# Patient Record
Sex: FEMALE | Race: WHITE | NOT HISPANIC OR LATINO | Employment: STUDENT | URBAN - METROPOLITAN AREA
[De-identification: names, ages, dates, MRNs, and addresses within clinical notes are randomized per-mention and may not be internally consistent; named-entity substitution may affect disease eponyms.]

---
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
## 2024-02-05 ASSESSMENT — PATIENT HEALTH QUESTIONNAIRE - PHQ9
10. IF YOU CHECKED OFF ANY PROBLEMS, HOW DIFFICULT HAVE THESE PROBLEMS MADE IT FOR YOU TO DO YOUR WORK, TAKE CARE OF THINGS AT HOME, OR GET ALONG WITH OTHER PEOPLE: SOMEWHAT DIFFICULT
SUM OF ALL RESPONSES TO PHQ QUESTIONS 1-9: 4
SUM OF ALL RESPONSES TO PHQ QUESTIONS 1-9: 4

## 2024-02-06 ENCOUNTER — OFFICE VISIT (OUTPATIENT)
Dept: PSYCHOLOGY | Facility: CLINIC | Age: 19
End: 2024-02-06
Payer: COMMERCIAL

## 2024-02-06 DIAGNOSIS — F33.41 RECURRENT MAJOR DEPRESSION IN PARTIAL REMISSION (H): Primary | ICD-10-CM

## 2024-02-06 DIAGNOSIS — F64.0 GENDER DYSPHORIA IN ADULT: ICD-10-CM

## 2024-02-06 PROCEDURE — 90837 PSYTX W PT 60 MINUTES: CPT | Performed by: PSYCHOLOGIST

## 2024-02-14 NOTE — PROGRESS NOTES
[facility] - Progress Note    Date of Service: 24   Name: Estela Reddy  : 2005  Medical Record Number: [id number]  Treating Provider: Alla Jones LP  Type of Session: Individual  Present in Session: Mike  Session Start and Stop Time: 2:00- 2:56pm  Number of Minutes: 56  Treatment Plan Completed: 23    SERVICE MODALITY:  In Person    DSM-5 Diagnoses:  302.85  F64.0) Gender Dysphoria   (F33.41) Recurrent Major Depression (in remission)    Current Reported Symptoms and Status update:  Reports that depression and anxiety symptoms have stabilized and can now be considered to have moved into remission. Continues to have gender dysphoria related to her genitals that she would like to address.  As she has in the past, she continues to want to move forward with body dysphoria goals but this is not top of mind right now.    Changes since last session: reporting that she is struggling with dynamics between parents and with her due to impending divorce; has felt uncomfortable meeting the people that parents are dating.     Progress Toward Treatment Goals:   Satisfactory progress     Therapeutic Interventions/Treatment Strategies:    Area(s) of treatment focus addressed in this session included Symptom Management, Gender Health, Develop / Improve Independent Living Skills and Maintenance of Progress    Supportive Techniques designed to provide a safe place for Mike to explore gender-related issues as well as the anxiety/depression symptoms she experiences related to stressors and relational difficulties. Processed a number of instances that have been very uncomfortable for Mike in terms of parental divorce. In light of what we worked on last session, processed the extent to which she has been using support system. Encouraged her to actually share what is happening for her rather than continue to present as though she is not bothered by what is happening.  Provided a safe place  to actually explore the feelings that if left unchecked, can lead to increase in depressive symptoms.  Processed how she wants to show up in her relationships with her parents during this time and how she can control her 50% of this.      Psychotherapist offered support, feedback and validation and reinforced skills.       Patient Response:   Patient responded to session by listening, accepting support, verbalizing understanding and actively engaged  Possible barriers to participation / learning include: N/A    Current Mental Status Exam:   Appearance:  Appropriate   Eye Contact:  Good   Attitude / Demeanor: Cooperative  Interested Playful Friendly Pleasant  Speech      Rate / Production: Normal/ Responsive      Volume:  Normal  volume  Orientation:  All  Mood:   Sad but also okay  Affect:   Appropriate to context  Thought Content: Clear   Insight:   Good       Plan/Need for Future Services:  Return for therapy in 4 weeks to treat diagnosed problems.    Patient has a current master individualized treatment plan.  See Epic treatment plan for more information.    Referral / Collaboration:  Referral to another professional/service is not indicated at this time..  Emergency Services Needed?  No    Assignment:  NEW: Think differently about seeking support.   CONTINUED:Consider gender-related goals.     Interactive Complexity:  There are four specific communication difficulties that complicate the work of the primary psychiatric procedure.  Interactive complexity (+09289) may be reported when at least one of these difficulties is present.    Communication difficulties present during current the psychiatric procedure include:  None.      Signature/Title:    Alla Jones LP

## 2024-04-02 ENCOUNTER — OFFICE VISIT (OUTPATIENT)
Dept: PSYCHOLOGY | Facility: CLINIC | Age: 19
End: 2024-04-02
Payer: COMMERCIAL

## 2024-04-02 DIAGNOSIS — F33.41 RECURRENT MAJOR DEPRESSION IN PARTIAL REMISSION (H): ICD-10-CM

## 2024-04-02 DIAGNOSIS — F64.0 GENDER DYSPHORIA IN ADULT: Primary | ICD-10-CM

## 2024-04-02 PROCEDURE — 90837 PSYTX W PT 60 MINUTES: CPT | Performed by: PSYCHOLOGIST
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
## 2024-04-03 ENCOUNTER — PATIENT OUTREACH (OUTPATIENT)
Dept: CARE COORDINATION | Facility: CLINIC | Age: 19
End: 2024-04-03
Payer: COMMERCIAL

## 2024-04-03 NOTE — PROGRESS NOTES
Clinic Care Coordination Contact  [facility]/Voicemail    Clinical Data: Care Coordinator Outreach    Outreach Documentation Number of Outreach Attempt   4/3/2024   2:50 PM 1       Left message on patient's voicemail with call back information and requested return call.    Plan: Care Coordinator will send care coordination introduction letter with care coordinator contact information and explanation of care coordination services via [employer]. Care Coordinator will try to reach patient again in 1-2 business days.    BETSEY Rodriguez  Social Work Care Coordinator  [facility]  [phone]  [email or web address]  Pronouns: They/He

## 2024-04-03 NOTE — ADDENDUM NOTE
Addended by: DORIS CALIXTO on: 4/3/2024 09:18 AM     Modules accepted: Orders     Well baby, under 8 days old

## 2024-04-03 NOTE — LETTER
[facility] CARE COORDINATION  [facility] Clinic  [address], [address], [place] [address]  April 5, 2024    Mike Reddy  [address] [facility] [address]      Dear Mike,    I am a clinic care coordinator who works with Dr. Alla Jones at the [facility] in [place], [place]. I wanted to introduce myself and provide you with my contact information for you to be able to call me with any questions or concerns. I have been trying to reach you recently to introduce Clinic Care Coordination. I recently tried to call and was unable to reach you. Below is a description of clinic care coordination and how I can further assist you.       The clinic care coordination team is made up of a registered nurse, , and  financial resource worker who understand the health care system. The goal of clinic care coordination is to help you manage your health and improve access to the health care system. Our team works alongside your provider to assist you in determining your health and social needs. We can help you obtain health care and community resources, providing you with necessary information and education. We can work with you through any barriers and develop a care plan that helps coordinate and strengthen the communication between you and your care team.  Our services are voluntary and are offered without charge to you personally.    Please feel free to contact me with any questions or concerns regarding care coordination and what we can offer.      We are focused on providing you with the highest-quality healthcare experience possible.    Sincerely,     BETSEY Rodriguez  Social Work Care Coordinator  [facility] Gender and Sexual Health [facility]  [phone]  [email or web address]  Pronouns: They/He

## 2024-04-05 NOTE — PROGRESS NOTES
Clinic Care Coordination Contact  [facility]/Voicemail    Clinical Data: Care Coordinator Outreach    Outreach Documentation Number of Outreach Attempt   4/3/2024   2:50 PM 1   4/5/2024  11:52 AM 2       Left message on patient's voicemail with call back information and requested return call.    Plan: Care Coordinator sent care coordination introduction letter on 4/5/24 via [employer]. Care Coordinator will try to reach patient again in 10 business days.    BETSEY Rodriguez  Social Work Care Coordinator  [facility] Sexual Health [facility]  [phone]  [email or web address]  Pronouns: They/He

## 2024-04-16 ENCOUNTER — PATIENT OUTREACH (OUTPATIENT)
Dept: CARE COORDINATION | Facility: CLINIC | Age: 19
End: 2024-04-16
Payer: COMMERCIAL

## 2024-04-16 ASSESSMENT — ACTIVITIES OF DAILY LIVING (ADL): DEPENDENT_IADLS:: INDEPENDENT

## 2024-04-16 NOTE — PROGRESS NOTES
Clinic Care Coordination Contact  Clinic Care Coordination Contact  OUTREACH    Referral Information:  Referral Source:  (Dr. Alla Jones, )         Chief Complaint   Patient presents with    Clinic Care Coordination - Initial        Universal Utilization: This worker spoke with Mike confirming she still needs to find a provider for hormone injections and Dexa scans. This worker and Mike discussed Dr. Kessler and the [facility]. Mike endorsed understanding and denied having any further questions at this time. Mike confirms she would like ongoing monthly check-ins      Mike reports understanding and denies any additional questions or concerns at this time. YISSEL CC engaged in AIDET communication during encounter.     Clinic Utilization  Difficulty keeping appointments:: No  Compliance Concerns: No  No-Show Concerns: No  No PCP office visit in Past Year: No  Utilization      No Show Count (past year)  0             ED Visits  0             Hospital Admissions  0                    Current as of: 4/10/2024  6:55 PM                Clinical Concerns:  Current Medical Concerns:  na    Current Behavioral Concerns: na    Education Provided to patient: care coordination, specialty providers   Pain  Pain (GOAL):: No  Health Maintenance Reviewed:    Clinical Pathway:     Medication Management:  Medication review status:   na     Functional Status:  Dependent ADLs:: Independent  Dependent IADLs:: Independent  Bed or wheelchair confined:: No  Mobility Status: Independent  Fallen 2 or more times in the past year?: No  Any fall with injury in the past year?: No    Living Situation:  Current living arrangement:: Not Assessed    Lifestyle & Psychosocial Needs:    Social Determinants of Health     Food Insecurity: Not on file   Depression: Not at risk (4/2/2024)    PHQ-2     PHQ-2 Score: 1   Housing Stability: Not on file   Tobacco Use: Medium Risk (4/19/2022)    Patient History     Smoking Tobacco Use: Passive Smoke Exposure - Never  Smoker     Smokeless Tobacco Use: Never     Passive Exposure: Yes   Financial Resource Strain: Not on file   Alcohol Use: Not on file   Transportation Needs: Not on file   Physical Activity: Not on file   Interpersonal Safety: Not on file   Stress: Not on file   Social Connections: Not on file   Health Literacy: Not on file     Inadequate nutrition (GOAL):: No  Tube Feeding: No  Inadequate activity/exercise (GOAL):: No  Significant changes in sleep pattern (GOAL): No  Transportation means:: Regular car     Roman Catholic or spiritual beliefs that impact treatment:: No  Mental health DX:: Yes  Mental health DX how managed:: Medication, Outpatient Counseling  Mental health management concern (GOAL):: No  Informal Support system:: Friends, Family          Resources and Interventions:  Current Resources:      Community Resources: OP Mental Health,      Equipment Currently Used at Home: none  Employment Status: employed full-time, student         Advance Care Plan/Directive  Advanced Care Plans/Directives on file:: No    Referrals Placed: Specialty Providers       Care Plan:  Care Plan: General       Problem: Patient is in need of specialty care       Long-Range Goal: Establish Specialty Care       Start Date: 4/16/2024    This Visit's Progress: 30%    Note:     Barriers: specialized medical care  Strengths: informed and active  Patient expressed understanding of goal: yes  Action steps to achieve this goal:  1. I will reach out to Dr. Kessler in [place] to start hormone suppressant injections  2. I will reach out to [facility] to complete needed scans  3. I will coordinate and collaborate with the [facility] to navigate problems should they arise.                                Patient/Caregiver understanding: yes    Outreach Frequency: monthly, more frequently as needed  Future Appointments                In 6 days [facility], INOCENTE [facility]UHNI    In 1 week Hong Rodriguez MD  [facility]    In 1 month Alla Jones LP [facility]            Plan:   Mike to follow up with Dr. Kessler for Hormone injections on site  Mike to contact [facility] for Dexa Scan  Mike to follow up with this worker as needed prior to next scheduled outreach  This worker to follow up in one month      BETSEY Rodriguez  Social Work Care Coordinator  [facility] Sexual Health Clinic  [phone]  [email or web address]  Pronouns: They/He

## 2024-04-16 NOTE — LETTER
[facility]: Care Plan    My Information     Name: Estela Reddy   YOB: 2005  Phone Number(s):  [phone] (home)   Address: [address] [place] [id number]    My Access Plan     Who to contact when I need help:  During business hours, call the clinic at: [phone]  After hours call the resident on call at:  [phone]  Preferred Hospital:    Preferred Pharmacy: [id number] - [place], [place] - [address] [address]    Other     My Care Team Members     Patient Care Team         Relationship Specialty Notifications Start End    Joan Sanford, NP PCP - General Family Medicine  7/31/23     Phone: [phone] Fax: [phone]         [address] [facility] [place] [address] [address]    Alla Jones LP Assigned Behavioral Health Provider   11/21/21     Phone: [phone] Fax: [phone]         [address] [facility] [address]    Joan Safnord NP Assigned PCP   1/25/24     Phone: [phone] Fax: [phone]         [address] [facility] [place] [address]    Moustapha Dong LSW Specialty Care Coordinator  Admissions 4/4/24               My Medications     Current Outpatient Medications   Medication Sig Dispense Refill    estradiol (ESTRACE) 2 MG tablet Take 2 tablets (4 mg) by mouth daily 180 tablet 1    sertraline (ZOLOFT) 100 MG tablet TAKE 2 TABLETS(200 MG) BY MOUTH DAILY. FOLLOW UP AS NEEDED FOR ANY HEALTHCARE QUESTIONS OR CONCERNS 180 tablet 0       My Life     Current Living Arrangement:  Current living arrangement:: Not Assessed   Type of residence:      Resources:       Community Resources: OP Mental Health,        Equipment Currently Used at Home: none     Advance Care Plan/Directive  Advanced Care Plans/Directives on file:: No     Referrals Placed: Specialty Providers    Activities that can be difficult for me at times:   Dependent ADLs:  Dependent ADLs:: Independent   Depended IADLs:  Dependent IADLs:: Independent   Mobility status: Mobility Status: Independent      My Health     Patient Active Problem List    Diagnosis Date Noted    Current mild episode of major depressive disorder without prior episode (H24) 04/19/2022     Priority: Medium    Vasovagal syncope 02/26/2019     Priority: Medium     Fainting, vomiting with blood draws. Needs to remain laying down for 15-20 minutes post blood draws.       Gender dysphoria in adolescent and adult 06/25/2017     Priority: Medium         My Goals     Care Plan: General       Problem: Patient is in need of specialty care       Long-Range Goal: Establish Specialty Care       Start Date: 4/16/2024    This Visit's Progress: 30%    Note:     Barriers: specialized medical care  Strengths: informed and active  Patient expressed understanding of goal: yes  Action steps to achieve this goal:  1. I will reach out to Dr. Kessler in [place] to start hormone suppressant injections  2. I will reach out to [facility] to complete needed scans  3. I will coordinate and collaborate with the [facility] to navigate problems should they arise.                                          F10.21

## 2024-04-16 NOTE — LETTER
[facility] CARE COORDINATION  Sexual and Gender Health Clinic  April 16, 2024    Estela Reddy  [address] [facility] [address]      Dear Mike,    I am a clinic care coordinator who works with Dr. Alla Jones LP and Dr. Hong Rodriguez MD at the [facility]. I wanted to introduce myself and provide you with my contact information for you to be able to call me with any questions or concerns. I wanted to thank you for spending the time to talk with me.  Below is a description of clinic care coordination and how I can further assist you.       The clinic care coordination team is made up of a registered nurse and  who understand the health care system. The goal of clinic care coordination is to help you manage your health and improve access to the health care system. Our team works alongside your provider to assist you in determining your health and social needs. We can help you obtain health care and community resources, providing you with necessary information and education. We can work with you through any barriers and develop a care plan that helps coordinate and strengthen the communication between you and your care team.  Our services are voluntary and are offered without charge to you personally.    Please feel free to contact me with any questions or concerns regarding care coordination and what we can offer.      We are focused on providing you with the highest-quality healthcare experience possible.    Sincerely,     BETSEY Rodriguez  Social Work Care Coordinator  [facility] Gender and Sexual Health [facility]  [phone]  [email or web address]  Pronouns: They/He

## 2024-04-18 ENCOUNTER — DOCUMENTATION ONLY (OUTPATIENT)
Dept: LAB | Facility: CLINIC | Age: 19
End: 2024-04-18
Payer: COMMERCIAL

## 2024-04-18 NOTE — PROGRESS NOTES
Patient has an upcoming lab appointment on 4.22.24 at 2:15 pm. Please review and place future orders that may be needed. Otherwise please call patient to cancel lab appointment.    Thank you,  BE lab staff

## 2024-04-19 DIAGNOSIS — F64.0 TRANSGENDER PERSON ON HORMONE THERAPY: Primary | ICD-10-CM

## 2024-04-19 DIAGNOSIS — Z79.899 TRANSGENDER PERSON ON HORMONE THERAPY: Primary | ICD-10-CM

## 2024-04-25 ENCOUNTER — OFFICE VISIT (OUTPATIENT)
Dept: FAMILY MEDICINE | Facility: CLINIC | Age: 19
End: 2024-04-25
Payer: COMMERCIAL

## 2024-04-25 VITALS
HEIGHT: 72 IN | SYSTOLIC BLOOD PRESSURE: 124 MMHG | DIASTOLIC BLOOD PRESSURE: 84 MMHG | BODY MASS INDEX: 27.3 KG/M2 | WEIGHT: 201.6 LBS | HEART RATE: 96 BPM

## 2024-04-25 DIAGNOSIS — Z79.899 TRANSGENDER PERSON ON HORMONE THERAPY: ICD-10-CM

## 2024-04-25 DIAGNOSIS — F64.0 TRANSGENDER PERSON ON HORMONE THERAPY: ICD-10-CM

## 2024-04-25 PROCEDURE — 99214 OFFICE O/P EST MOD 30 MIN: CPT | Performed by: FAMILY MEDICINE

## 2024-04-25 RX ORDER — ESTRADIOL 2 MG/1
4 TABLET ORAL DAILY
Qty: 180 TABLET | Refills: 0 | Status: SHIPPED | OUTPATIENT
Start: 2024-04-25 | End: 2024-08-12

## 2024-04-25 NOTE — PROGRESS NOTES
CHRIS Mckeon is a 18 year old individual that uses pronouns She/Her/Hers/Herself that presents today for follow up of:  feminizing hormone therapy.   Alone or accompanied by: accompanied today by  Gender identity: female  Started Hormone  therapy    Puberty suppresion in 2016-2/2023, hormones 2019   Continues on Estrace 4* mg daily   Any special concerns today?    Last seen 7/2023  Lab work scheduled for next week  Needs DEXA scan, was told needs order  Working with Moustapha, was told a Dr. Catrachita Kessler can do on site injections for her ([facility]). Unclear if Dr. Kessler will be ordering GnRHa agonist or simply doing injection of pharmacy medication for patient.     Barriers to following care in the past year: parents getting divorce, working 5x/week, and going to school ([employer])  Care coordinator has been very helpful in back on track with logistics of care.         On hormones?  YES +++ Shot day of the week? Not applicable-taking pills/patch/gel      Due for labs?  Yes      +++ Refills of meds needed?  Yes  Gender related body changes since last visit:   Since off Eliguard and increase in dose Estrace, no significant changes  A little more body hair  No change in genital or sexual function    Breakthrough bleeding? Does Not Apply    New health concerns since last visit:  ---none    No past surgical history on file.    Patient Active Problem List   Diagnosis    Gender dysphoria in adolescent and adult    Vasovagal syncope    Current mild episode of major depressive disorder without prior episode (H24)       Current Outpatient Medications   Medication Sig Dispense Refill    estradiol (ESTRACE) 2 MG tablet Take 2 tablets (4 mg) by mouth daily 180 tablet 1    sertraline (ZOLOFT) 100 MG tablet TAKE 2 TABLETS(200 MG) BY MOUTH DAILY. FOLLOW UP AS NEEDED FOR ANY HEALTHCARE QUESTIONS OR CONCERNS 180 tablet 0       History   Smoking Status    Passive Smoke Exposure - Never Smoker    Packs/day: 0.00     Years: 0.00    Types: Cigarettes   Smokeless Tobacco    Never   Non smoker  Alcohol--1/month  No substances  Working at restaurant; on feet all time at work     No Known Allergies    Health Maintenance Due   Topic Date Due    MENTAL HEALTH TX PLAN  03/09/2024         Problem, Medication and Allergy Lists were reviewed and are current..         Review of Systems:   Review of Systems  No hot flashes/night sweats         Labs:   Results from last visit:  Lab on 07/26/2023   Component Date Value Ref Range Status    Sodium 07/26/2023 143  136 - 145 mmol/L Final    Potassium 07/26/2023 4.3  3.4 - 5.3 mmol/L Final    Chloride 07/26/2023 107  98 - 107 mmol/L Final    Carbon Dioxide (CO2) 07/26/2023 24  22 - 29 mmol/L Final    Anion Gap 07/26/2023 12  7 - 15 mmol/L Final    Urea Nitrogen 07/26/2023 15.3  6.0 - 20.0 mg/dL Final    Creatinine 07/26/2023 0.69  0.51 - 1.17 mg/dL Final    Male and Female  0-2 Months    0.31-0.88 mg/dL  2-12 Months   0.16-0.39 mg/dL  1-2 Years     0.18-0.35 mg/dL  3-4 Years     0.26-0.42 mg/dL  5-6 Years     0.29-0.47 mg/dL  7-8 Years     0.34-0.53 mg/dL  9-10 Years    0.33-0.64 mg/dL  11-12 Years   0.44-0.68 mg/dL  13-14 Years   0.46-0.77 mg/dL    Female  15 Years and older  0.51-0.95 mg/dL    Male  15 Years and older  0.67-1.17 mg/dL        Calcium 07/26/2023 9.6  8.6 - 10.0 mg/dL Final    Glucose 07/26/2023 89  70 - 99 mg/dL Final    Alkaline Phosphatase 07/26/2023 84  35 - 129 U/L Final    Female:    0-15 days     U/L  15d-1 year   122-469 U/L  1-10 years   142-335 U/L  10-13 years  129-417 U/L  13-15 years   U/L  15-17 years   U/L  17-19 years  45-87 U/L  19 years and older   U/L      Male:  0-15 days     U/L  15d-1 year   122-469 U/L  1-10 years   142-335 U/L  10-13 years  129-417 U/L  13-15 years  116-468 U/L  15-17 years   U/L  17-19 years   U/L  19 years and older   U/L      AST 07/26/2023 24  0 - 35 U/L Final    Reference intervals for this  "test were updated on 6/12/2023 to more accurately reflect our healthy population. There may be differences in the flagging of prior results with similar values performed with this method. Interpretation of those prior results can be made in the context of the updated reference intervals.    ALT 07/26/2023 9  0 - 50 U/L Final    Female   All ages       0-50 U/L     Male   0-20 Years     0-50 U/L  20-Unsp. Years 0-70 U/L      Reference intervals for this test were updated on 6/12/2023 to more accurately reflect our healthy population. There may be differences in the flagging of prior results with similar values performed with this method. Interpretation of those prior results can be made in the context of the updated reference intervals.      Protein Total 07/26/2023 7.7  6.4 - 8.3 g/dL Final    Albumin 07/26/2023 5.1  3.5 - 5.2 g/dL Final    Bilirubin Total 07/26/2023 0.3  <=1.2 mg/dL Final    GFR Estimate 07/26/2023 >90  >60 mL/min/1.73m2 Final    The generation of the estimated GFR is currently based on binary male or female sex. If the electronic health record information indicates another gender identity or if Legal Sex is recorded as \"Unknown\", GFR estimates are not automatically calculated, and application of GFR equations or a direct GFR measurement should be considered according to the individual's appropriate clinical context.    Cholesterol 07/26/2023 141  <170 mg/dL Final    Triglycerides 07/26/2023 52  <=90 mg/dL Final    Direct Measure HDL 07/26/2023 53  >=45 mg/dL Final    LDL Cholesterol Calculated 07/26/2023 78  <=110 mg/dL Final    Non HDL Cholesterol 07/26/2023 88  <120 mg/dL Final    Estradiol 07/26/2023 50  pg/mL Final    Healthy Men:   11.3-43.2 pg/mL    Healthy Postmenopausal Women:  Postmenopause: <5-138 pg/mL    Healthy Pregnant Women:  1st trimester: 154-3243 pg/mL  2nd trimester: 1561-06262 pg/mL  3rd trimester: 8525->57331 pg/mL    Healthy Women Cycle Phase:  Follicular: 30.9-90.4 " pg/mL  Ovulation: 60.4-533 pg/mL  Luteal: 60.4-232 pg/mL    Healthy Women Cycle Sub-Phase:  Early Follicular: 20.5-62.8 pg/mL  Intermediate Follicular: 26-79.8 pg/mL  Late Follicular: 49.5-233 pg/mL  Ovulation: 60.4-602 pg/mL  Early Luteal: 51.1-179 pg/mL  Intermediate Luteal: 66.5-305 pg/mL  Late Luteal: 30.2-222 pg/mL    Sex Hormone Binding Globulin 07/26/2023 51  11 - 135 nmol/L Final    Female Reference Range:  Guilherme Stage I:  nmol/L  Guilherme Stage II:  nmol/L  Guilherme Stage III: 12-98 nmol/L  Guilherme Stage IV:  nmol/L  Guilherme Stage V:  nmol/L    Male Reference Range:  Guilherme Stage I:  nmol/L  Guilherme Stage II:  nmol/L  Guilherme Stage III:  nmol/L  Guilherme Stage IV: 11-60 nmol/L  Guilherme Stage V: 11-71 nmol/L    Free Testosterone Calculated 07/26/2023 0.45  ng/dL Final    Testosterone Total 07/26/2023 33  8 - 950 ng/dL Final    Comment:   MALE:  0 to 5 months:  ng/dL  6 months to 9 years: <2-20 ng/dL  10 to 11 years: <2-130 ng/dL  12 to 13 years: <2-800 ng/dL  14 years: <2-1200 ng/dL  15 to 16 years: 100-1200 ng/dL  17 to 18 years: 300-1200 ng/dL  19 years and older: 240-950 ng/dL    FEMALE:  0 to 5 months: 20-80 ng/dL  6 months to 9 years: <2-20 ng/dL  10 to 11 years: <2-44 ng/dL  12 to 16 years: <2-75 ng/dL  17 to 18 years: 20-75 ng/dL  19 years and older: 8-60 ng/dL    Values by Guilherme Stage:  Stage I (prepubertal)  Male: <2 to 20 ng/dL  Female: <2 to 20 ng/dL    Stage II  Male: 8 to 66 ng/dL  Female: <2 to 47 ng/dL    Stage III  Male: 26 to 800 ng/dL  Female: 17 to 75 ng/dL    Stage IV  Male: 85 to 1200 ng/dL  Female: 20 to 75 ng/dL    Stage V (young adult)  Male: 300 to 950 ng/dL  Female: 12 to 60 ng/dL    Puberty onset (transition from Guilherme Stage I to Guilherme Stage II) occurs for boys at a median age of 11.5 years and for girls at median age of 10.5 years. There is evidence that it may occur up to 1 year                            earlier in obese girls and in  " girls. For boys there is no definite proven relationship between puberty onset and body weight or ethnic origin. Progression through Guilherme stages is variable. Guilherme Stage V (young adult) should be reached by age 18.             EXAM:  Vitals reviewed  Constitutional: healthy, alert, and no distress   Cardiovascular: negative, PMI normal. No lifts, heaves, or thrills. RRR. No murmurs, clicks gallops or rub  Respiratory: negative, Percussion normal. Good diaphragmatic excursion. Lungs clear  Psychiatric: mentation appears normal and affect normal/bright  Neck: Neck supple. No adenopathy. Thyroid symmetric, normal size,, Carotids without bruits.   No sigificant facial arm hairj, no acne  Assessment and Plan   Transgender person on hormone therapy  Satisfied with where body at in response to [facility]  Talking about surgeries with Dr. Jones, not immediate priority  Counseled about orchiectomy and min and full depth vagionoplasty  To do labs previously ordered  DEXA scan ordered    [facility] dosing\"  To continue current dose estradiol, adjust based on labs  Pt. Elects to restart Lupron vs. Starting spironolactone  --discussed unclear if insurance will or will not cover restart of Lupron,   --Pt. Thinks could learn to do self injection of Lupron if needed   Wait for labs and consider spironolactone if start of Lupron longer than 2+ months (appeals, etc)  Plan spironolactone if lupron not covered  Dr. Rodriguez to clarfiy how doing lupron with Moustapha and Dr. Nieves, or arrange for Lupron injections at [facility] vs. Pharmacy benefit plus self injection    Follow-up 4 months    Not sexually active        [facility]  Questions were elicited and answered.     Hong Rodriguez MD    "

## 2024-05-01 DIAGNOSIS — F64.0 GENDER DYSPHORIA IN ADULT: Primary | ICD-10-CM

## 2024-05-06 ENCOUNTER — LAB (OUTPATIENT)
Dept: LAB | Facility: CLINIC | Age: 19
End: 2024-05-06
Payer: COMMERCIAL

## 2024-05-06 DIAGNOSIS — Z79.899 TRANSGENDER PERSON ON HORMONE THERAPY: ICD-10-CM

## 2024-05-06 DIAGNOSIS — F64.0 TRANSGENDER PERSON ON HORMONE THERAPY: ICD-10-CM

## 2024-05-06 PROCEDURE — 36415 COLL VENOUS BLD VENIPUNCTURE: CPT

## 2024-05-06 PROCEDURE — 82306 VITAMIN D 25 HYDROXY: CPT

## 2024-05-06 PROCEDURE — 84403 ASSAY OF TOTAL TESTOSTERONE: CPT

## 2024-05-06 PROCEDURE — 84270 ASSAY OF SEX HORMONE GLOBUL: CPT

## 2024-05-06 PROCEDURE — 80048 BASIC METABOLIC PNL TOTAL CA: CPT

## 2024-05-06 PROCEDURE — 82670 ASSAY OF TOTAL ESTRADIOL: CPT

## 2024-05-07 LAB
ANION GAP SERPL CALCULATED.3IONS-SCNC: 11 MMOL/L (ref 7–15)
BUN SERPL-MCNC: 11.6 MG/DL (ref 6–20)
CALCIUM SERPL-MCNC: 9.7 MG/DL (ref 8.6–10)
CHLORIDE SERPL-SCNC: 104 MMOL/L (ref 98–107)
CREAT SERPL-MCNC: 0.84 MG/DL (ref 0.51–1.17)
DEPRECATED HCO3 PLAS-SCNC: 25 MMOL/L (ref 22–29)
EGFRCR SERPLBLD CKD-EPI 2021: >90 ML/MIN/1.73M2
ESTRADIOL SERPL-MCNC: 47 PG/ML
GLUCOSE SERPL-MCNC: 91 MG/DL (ref 70–99)
POTASSIUM SERPL-SCNC: 4.3 MMOL/L (ref 3.4–5.3)
SHBG SERPL-SCNC: 29 NMOL/L (ref 11–135)
SODIUM SERPL-SCNC: 140 MMOL/L (ref 135–145)
VIT D+METAB SERPL-MCNC: 26 NG/ML (ref 20–50)

## 2024-05-09 LAB
TESTOST FREE SERPL-MCNC: 10.16 NG/DL
TESTOST SERPL-MCNC: 456 NG/DL (ref 8–950)

## 2024-05-16 ENCOUNTER — PATIENT OUTREACH (OUTPATIENT)
Dept: CARE COORDINATION | Facility: CLINIC | Age: 19
End: 2024-05-16
Payer: COMMERCIAL

## 2024-05-16 NOTE — PROGRESS NOTES
Clinic Care Coordination Contact  [facility]/Voicemail    Clinical Data: Care Coordinator Outreach    Outreach Documentation Number of Outreach Attempt   4/3/2024   2:50 PM 1   4/5/2024  11:52 AM 2   5/16/2024   3:30 PM 1       Left message on patient's voicemail with call back information and requested return call.    Plan: Care Coordinator will send unable to contact letter with care coordinator contact information via [employer]. Care Coordinator will try to reach patient again in 1 month.    BETSEY Rodriguez  Social Work Care Coordinator  [facility]  [phone]  [email or web address]  Pronouns: They/He

## 2024-05-17 NOTE — RESULT ENCOUNTER NOTE
Mike,     The results of your recent non hormone  lab tests were normal.      Hormone levels were not  in the appropriate female range. Your testosterone levels are in the usual male range, and your estradiol levels were low. Did you miss taking your estradiol on or around the day your labs were done (May 6?). If not, we should increase your dose to 6 mg daily. The staff at [facility] are working on getting Lupron restarted for you, but it is taking a while.   Let me know if you wish to start spironolactone in the meantime.    Hong Rodriguez MD        Please note that test explanations are brief and do not reflect all diagnostic uses.  If you have any questions or concerns, please contact me via [employer] or call the clinic at [phone].    Hong Rodriguez MD, PhD

## 2024-06-04 ENCOUNTER — OFFICE VISIT (OUTPATIENT)
Dept: PSYCHOLOGY | Facility: CLINIC | Age: 19
End: 2024-06-04
Payer: COMMERCIAL

## 2024-06-04 DIAGNOSIS — F43.22 ACUTE ADJUSTMENT DISORDER WITH ANXIETY: Primary | ICD-10-CM

## 2024-06-04 DIAGNOSIS — F64.0 TRANSSEXUALISM: ICD-10-CM

## 2024-06-04 DIAGNOSIS — F33.41 RECURRENT MAJOR DEPRESSION IN PARTIAL REMISSION (H): ICD-10-CM

## 2024-06-04 PROCEDURE — 90791 PSYCH DIAGNOSTIC EVALUATION: CPT | Performed by: PSYCHOLOGIST

## 2024-06-04 ASSESSMENT — PATIENT HEALTH QUESTIONNAIRE - PHQ9
SUM OF ALL RESPONSES TO PHQ QUESTIONS 1-9: 4
SUM OF ALL RESPONSES TO PHQ QUESTIONS 1-9: 4
10. IF YOU CHECKED OFF ANY PROBLEMS, HOW DIFFICULT HAVE THESE PROBLEMS MADE IT FOR YOU TO DO YOUR WORK, TAKE CARE OF THINGS AT HOME, OR GET ALONG WITH OTHER PEOPLE: NOT DIFFICULT AT ALL

## 2024-06-04 NOTE — PROGRESS NOTES
" [facility]                                                                                                            [address]  [place] [address]                                                                                                                                                Phone: [phone]                                                                                                                                                     Fax: [phone]                                                                                                       [email or web address]     ANNUAL UPDATE: Diagnostic Assessment Interview      Date of Service: 6/04/24    Client Name: Estela Reddy  YOB: 2005  19 year old  MRN:  [id number]  Gender/Gender Identity: \"transgender female\"  Treating Provider: Alla Jones, PhD SPENCER  Program: [facility]  Type of Session: Updated Assessment  Present in Session: client  Number of Minutes:  55 minutes                 Modality: In Person    Note:  In conjunction with this update, client also participated in a review and update of treatment plan that is entered below.     Updated Presenting Problem and Goals:  Gender Dysphoria social dysphoria symptoms continue to lessen as client has grown into adulthood and exiting the high school environment where everyone knew about trans identity.  Is now much less anxious and more relaxed in public as \"just being able to be is such a weight off my shoulders.\"   No longer feels like her interactions are \"colored by my trans identity\".  Can now live my life separate from my trans-identity which \"feels freeing as well as a betrayal to the trans community.\"       Body Dysphoria: Client reports not \"terrible but not great either. It isn't constantly occupying my mind.\"  Is pleased with breast growth and is not necessarily expecting any more so is satisfied and " "\"grateful with what I have.\"  Genital dysphoria is still present and is interested in pursuing vaginoplasty at some point.  Is recognizing that she does compare herself negatively with other women and wonders if this is why she has not had any sexual encounters with women even though she is attracted to them.     In terms of anxiety, has some typical level of \"adulting\" fears such as moving out of parental home, finances (is working fulltime).  Also experiencing ongoing stress related to parents divorce process including worry about about mother's alcohol intake.  Since being a  and experiencing rude behavior, recognizes that she has some anxiety about wanting to be well-liked as a customer. [place] have [place]       In terms of depression, still experiencing some symptoms (feeling down, sleep disruption and fatigue) but feels it is manageable rather than debilitating.      Updated Mental Health History:   Within the last year, there were no psychiatric hospitalizations. No suicide attempts. No suicidal ideation.       Therapy: Has been attending individual therapy about every other month which she feels is a good fit for her right now.      Medications: still prescribed Zoloft (approximately 200mg) but only takes it when feels it is needed and hasn't taken it for a couple of months. Understands that this is not a helpful way to use this medication and was encouraged to make a decision, in concert with prescriber, Dr. Joan Meehan (GP) about whether to discontinue this medication or take it as prescribed.      Updated Substance Use:   Reports using alcohol about once a month where she has one to two drinks.       Updated Medical History:   No major medical issues in the last year.  Is still on the suppression and has now been taking estrogen for over 5 years through Dr. Rodriguez.  Is in the process of figuring out whether she can continue to take lupron due to " "insurance coverage or will have to switch to spirolactone. Is considering whether to consolidate care with GP clinic.  Is working on what would be best with current hormone prescriber, Dr. Rodriguez.        Updated Contraception Use:  is sexually active \"sometimes\" and makes sure that the man provides all STD protection.  No risk for pregnancy.       Updated Family History:   Parental divorce process has been very diffcult for her.  Mother's continued alcoholism is very worrisome.   Would like to move out of parental home but things are very uncertain about who will be living where as divorce proceeds.       Updated Current Significant Relationship/Partner:  Has began to have some sexual encounters with men. Does not appreciate or interact with men who appear to be in this for fetishistic purposes. Generally feels adept at disclosure needed in these situations. Reports that sexual/romantic experiences have allowed her to get clearer on her attractions.       Concurrent/extramarital relationships: n/a     Updated Educational History:  Took a gap year. Taking community college classes towards transfer to a 4 year college for English teaching.  These are going well.     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  Likes this role and is really good at it. Get compliments from the .      Updated Legal Issues:  n/a  ________________________________________________________________  CONCLUSIONS     Updated Strengths and Liabilities:   Strengths include: \"social, perceptive, insightful, creative, introspective sometimes.\"   Limitations include: easily prone to boredom, can be interpersonally provocative and doesn't always have a social filter -- tends to be socially polarizing but knows and is okay if not everyone likes her.         Updated Symptoms:  Updated PHQ-9 = 3 (up from 1)  AMMON-7 = 4 (same)  CAGE-AID = 0     PROMIS - 10 Total  = " "36             Global Mental Health Score = 15 = T-Score = 49.7 (good)             Global Physical Health Score = 17 = T-score = 54.1 (very good)        Mental Status:   Appearance:  Casually dressed and Well groomed  Behavior/relationship to examiner/demeanor:  Cooperative, Engaged and Pleasant  Orientation: Oriented to person, place, time and situation  Speech Rate:  Normal  Speech Spontaneity:  Normal  Mood:  \"good\", friendly, comfortable and euphoric  Affect:  Appropriate/mood-congruent  Thought Process (Associations):  Logical, Linear and Goal directed  Thought Content:  denies suicidal ideation, intent or thoughts and patient denies auditory and visual hallucinations  Abnormal Perception:  None  Attention/Concentration:  Fair and Easily distracted  Language:  Intact  Insight:  Adequate  Judgment:  Good                           Interactive Complexity:  Communication difficulties present during the current psychiatric procedure included: None     Updated DSM-5 Diagnoses:  302.85  (F64.0) Gender Dysphoria  (F33.41) Recurrent Major Depression (in remission)  (F43.22) Adjustment Disorder with Anxiety     Conclusions/Recommendations/Initial Treatment Goals:   The client is a  19 year old \"transgender female\" assigned male at birth.  Based on the client's current report of symptoms, client continues to meet criteria for gender dysphoria (at this point mainly from a body dysphoria perspective) which has been causing clinically significant distress. Anxiety appears to be best captured through an adjustment disorder given that much or it arises around phase of life developmental issues and the difficulties that arise due to parental divorce dynamics.  Depressive symptoms continue to decrease and given that client is no longer taking anti-depressant medication, appears to have moved into remission. The client reports no concerns about drug/alcohol use frequency and duration.  Client denies any safety concerns. " "Based on the client's reported symptoms and impact on functioning, the plan for the patient is:  Continue supportive individual therapy with a provider specialized in gender health on a reduced basis to maintain current stability and keep connection in for future pursuit of surgery.  2.   Continued collaboration with medical providers.      Alla Jones, PhD, LP         [facility]:  Individualized Treatment Plan     Date of Service: 24  Name: Estela Reddy  : 2005  Medical Record Number: [id number]  Date of Treatment Plan: 24  Date Treatment Plan Last Reviewed/Revised: 23    DSM5 Diagnoses:   302.85  (F64.0) Gender Dysphoria  (F33.41) Recurrent Major Depression (in remission)  (F43.22) Adjustment Disorder with Anxiety    Psychosocial / Contextual Factors: Mike is a 19 year old  individual assigned male at birth who identifies as \"transgender female.\"  Mike denied and cultural/[place] factors significant to treatment other than being a part of the LGBTQIA+ community.     Updated PHQ-9 = 3 (up from 2)  AMMON-7 = 4 (up from 1)  CAGE-AID = 0     PROMIS - 10 Total  = 36             Global Mental Health Score = 15 = T-Score = 49.7 (good)             Global Physical Health Score = 17 = T-score = 54.1 (very good)  Referral / Collaboration:  Referral to another professional/service is not indicated at this time.    Anticipated number of session for this episode of care: 6  Anticipation frequency of session:  bi-monthly  Anticipated Duration of each session: 45-55 minutes  Treatment plan will be reviewed in 180 days or when goals have been changed.    SERVICE MODALITY:  In Person      Impact of Symptoms on Function:  Decreased Social/Family Function    Sexual Problems:  Reports no problems or symptoms at this time    Gender Concerns:  Body/Anatomical Dysphoria    Client Strengths:  \"social, perceptive, insightful, creative, introspective sometimes.\" " "    Client Participation in Plan:  Contributed to goals and plan   Agrees with plan     Areas of Vulnerability:  Gender dysphoria; living in a culture where laws that are transphobic are being passed  Some anxiety about parental divorce    Long-Term Goals:  To address body dysphoria through medical intervention when ready  Stability and independent living    Discharge Criteria:  Satisfactory progress toward treatment goals      Areas of Treatment Focus     Why are you seeking treatment/What do you want to focus on during treatment? \"Continuing to get good at adulting and figuring out the steps to address body issues.\"       Area of Treatment Focus:   Embodiment  Start Date:    4-4-23  update 6-4-24    Goal: Target Date: 6-4-25 Status: Active  Support Mike's embodiment goals and improve her comfort when in sexually conducive situations.         Progress:  Is clear that she wants to have vaginoplasty and learn about what this requires.   She has grown in insight about the way that she negatively compares herself to cisgender women's bodies and how that impacts whether she chooses or not to act on sexual desire towards women.            Treatment Strategies:  Psychoeducation about medical intervention and the process of surgery;  Supportive discussions about safety.   Intervention(s):  Therapist will support Mike's gender embodiment goals and discuss how to achieve greater comfort in her body.     Area of Treatment Focus:   Continued Mental Health Stabilization   Start Date:    4-4-23  Updated 6-4-24    Goal: Target Date: 6-4-25 Status: Active  Maintain current state of positive mental health in terms of low level depression and anxiety symptoms.        Progress:  Until Mike became aware of parental divorce process, she was having minimal anxiety and depressive symptoms.  While these have increased a little, she is managing the symptoms well and recently decided to discontinue the medication meant to address these " symptoms.  So far, this discontinuation has been fine but does need support in taking the medication as prescribed or not at all.            Treatment Strategies:  CBT and Supportive Therapy Techniques   Intervention(s):  Therapist will regularly check in on mental health status and reinforce taking medication as prescribed or not at all.  Therapist will provide a safe space for Mike to explore her feelings about parental divorce and how best to cope.  Therapist will reinforce positive coping skills already acquired.          Area of Treatment Focus:  Identity Disclosure  Start Date:    4-4-23  updated 6-4-24    Goal: Target Date: 6-4-25 Status: Active  Explore Mike's comfort with identity disclosure in various settings, but in particular with others that she is feeling romantically and sexually attracted to.         Progress: Mike has made progress being comfortable with disclosure in sexually appropriate situations with men.  Is now having positive sexual encounters with men and feels empowered.            Treatment Strategies:  Supportive therapy; CBT, Psychoeducation   Intervention(s):  Therapist will monitor client's comfort with disclosure and engage in any needed problem solving strategies around identity disclosure with potential romantic/sexual partners.       Client created treatment plan with therapist, identified goals and verbally agreed to the notes that therapist had taken and would later enter into treatment plan template.  Was not able to sign due to this method of completion.       Alla Jones, PhD  Licensed Psychologist

## 2024-06-06 ASSESSMENT — ANXIETY QUESTIONNAIRES
7. FEELING AFRAID AS IF SOMETHING AWFUL MIGHT HAPPEN: SEVERAL DAYS
6. BECOMING EASILY ANNOYED OR IRRITABLE: SEVERAL DAYS
5. BEING SO RESTLESS THAT IT IS HARD TO SIT STILL: NOT AT ALL
IF YOU CHECKED OFF ANY PROBLEMS ON THIS QUESTIONNAIRE, HOW DIFFICULT HAVE THESE PROBLEMS MADE IT FOR YOU TO DO YOUR WORK, TAKE CARE OF THINGS AT HOME, OR GET ALONG WITH OTHER PEOPLE: SOMEWHAT DIFFICULT
1. FEELING NERVOUS, ANXIOUS, OR ON EDGE: SEVERAL DAYS
GAD7 TOTAL SCORE: 4
GAD7 TOTAL SCORE: 4
3. WORRYING TOO MUCH ABOUT DIFFERENT THINGS: SEVERAL DAYS
2. NOT BEING ABLE TO STOP OR CONTROL WORRYING: NOT AT ALL

## 2024-06-06 ASSESSMENT — PATIENT HEALTH QUESTIONNAIRE - PHQ9: 5. POOR APPETITE OR OVEREATING: NOT AT ALL

## 2024-06-23 ENCOUNTER — HEALTH MAINTENANCE LETTER (OUTPATIENT)
Age: 19
End: 2024-06-23

## 2024-06-26 ENCOUNTER — PATIENT OUTREACH (OUTPATIENT)
Dept: CARE COORDINATION | Facility: CLINIC | Age: 19
End: 2024-06-26
Payer: COMMERCIAL

## 2024-06-26 NOTE — PROGRESS NOTES
Clinic Care Coordination Contact  [facility]/Voicemail    Clinical Data: Care Coordinator Outreach    Outreach Documentation Number of Outreach Attempt   5/16/2024   3:30 PM 1   6/26/2024  12:19 PM 2       Left message on patient's voicemail with call back information and requested return call.    Plan: Care Coordinator will send unable to contact letter with care coordinator contact information via [employer]. Care Coordinator will try to reach patient again in 10 business days.    BETSEY Rodriguez  Social Work Care Coordinator  [facility]  [phone]  [email or web address]  Pronouns: They/He

## 2024-07-16 ENCOUNTER — PATIENT OUTREACH (OUTPATIENT)
Dept: CARE COORDINATION | Facility: CLINIC | Age: 19
End: 2024-07-16
Payer: COMMERCIAL

## 2024-07-16 NOTE — PROGRESS NOTES
Clinic Care Coordination Contact  [facility]/Voicemail    Clinical Data: Care Coordinator Outreach    Outreach Documentation Number of Outreach Attempt   6/26/2024  12:19 PM 2   7/16/2024   2:17 PM 3       Left message on patient's voicemail with call back information and requested return call.    Plan: Care Coordinator sent unable to contact letter on 7/16/24 via [email or web address]. Care Coordinator will try to reach patient again in 1 month.      BETSEY Rodriguez  Social Work Care Coordinator  [facility]  [phone]  [email or web address]  Pronouns: They/He

## 2024-07-16 NOTE — LETTER
[facility]: Care Plan    My Information     Name: Estela Reddy   YOB: 2005  Phone Number(s):  [phone] (home)   Address: [address] [place] [id number]    My Access Plan     Who to contact when I need help:  During business hours, call the clinic at: [phone]  After hours call the resident on call at:  [phone]  Preferred Hospital:    Preferred Pharmacy: [id number] - [place], [place] - [address]    Other     My Care Team Members     Patient Care Team         Relationship Specialty Notifications Start End    Joan Sanford NP PCP - General Family Medicine  7/31/23     Phone: [phone] Fax: [phone]         [address] [facility] [place] [address]    Alla Jones LP Assigned Behavioral Health Provider   11/21/21     Phone: [phone] Fax: [phone]         [address] [facility] [address]    Joan Sanford NP Assigned PCP   1/25/24     Phone: [phone] Fax: [phone]         [address] [facility] [place] [address]    Moustapha Dong LSW Specialty Care Coordinator  Admissions 4/4/24               My Medications     Current Outpatient Medications   Medication Sig Dispense Refill    estradiol (ESTRACE) 2 MG tablet Take 2 tablets (4 mg) by mouth daily 180 tablet 0    sertraline (ZOLOFT) 100 MG tablet TAKE 2 TABLETS(200 MG) BY MOUTH DAILY. FOLLOW UP AS NEEDED FOR ANY HEALTHCARE QUESTIONS OR CONCERNS 180 tablet 0       My Life     Current Living Arrangement:   N/A   Type of residence:      Resources:        OP Mental health,     Activities that can be difficult for me at times:   Dependent ADLs:   Independent   Depended IADLs:  Independent   Mobility status:  Indpendent      My Health     Patient Active Problem List    Diagnosis Date Noted    Current mild episode of major depressive disorder without prior episode (H24) 04/19/2022     Priority: Medium     Vasovagal syncope 02/26/2019     Priority: Medium     Fainting, vomiting with blood draws. Needs to remain laying down for 15-20 minutes post blood draws.       Gender dysphoria in adolescent and adult 06/25/2017     Priority: Medium         My Goals     Strengths: informed and active  Patient expressed understanding of goal: yes  Action steps to achieve this goal:  1. I will reach out to Dr. Kessler in [place] to start hormone suppressant injections  2. I will reach out to [facility] to complete needed scans  3. I will coordinate and collaborate with the [facility] to navigate problems should they arise.

## 2024-07-16 NOTE — LETTER
[facility] CARE COORDINATION  [facility]  July 16, 2024    Estela Reddy  [address] [address]      Dear Estela,    I have been attempting to reach you since our last contact. I would like to continue to work with you and provide any additional support you may need on achieving your health care related goals. I would appreciate if you would give me a call at [phone] to let me know if you would like to continue working together. I know that there are many things that can affect our ability to communicate and I hope we can continue to work together.    All of us at the [facility] are invested in your health and are here to assist you in meeting your goals.     Sincerely,    BETSEY Rodriguez

## 2024-07-31 ENCOUNTER — OFFICE VISIT (OUTPATIENT)
Dept: FAMILY MEDICINE | Facility: CLINIC | Age: 19
End: 2024-07-31
Payer: COMMERCIAL

## 2024-07-31 VITALS
HEART RATE: 116 BPM | TEMPERATURE: 97.3 F | RESPIRATION RATE: 18 BRPM | WEIGHT: 189 LBS | BODY MASS INDEX: 25.6 KG/M2 | HEIGHT: 72 IN | SYSTOLIC BLOOD PRESSURE: 112 MMHG | DIASTOLIC BLOOD PRESSURE: 80 MMHG | OXYGEN SATURATION: 98 %

## 2024-07-31 DIAGNOSIS — Z79.899 CURRENT USE OF ESTROGEN THERAPY: ICD-10-CM

## 2024-07-31 DIAGNOSIS — R07.89 ATYPICAL CHEST PAIN: Primary | ICD-10-CM

## 2024-07-31 DIAGNOSIS — Z82.49 FAMILY HISTORY OF CORONARY ARTERY DISEASE: ICD-10-CM

## 2024-07-31 PROBLEM — Z79.818 CURRENT USE OF ESTROGEN THERAPY: Status: ACTIVE | Noted: 2024-07-31

## 2024-07-31 PROCEDURE — G2211 COMPLEX E/M VISIT ADD ON: HCPCS | Performed by: NURSE PRACTITIONER

## 2024-07-31 PROCEDURE — 99213 OFFICE O/P EST LOW 20 MIN: CPT | Performed by: NURSE PRACTITIONER

## 2024-07-31 PROCEDURE — 93000 ELECTROCARDIOGRAM COMPLETE: CPT | Performed by: NURSE PRACTITIONER

## 2024-07-31 ASSESSMENT — PAIN SCALES - GENERAL: PAINLEVEL: NO PAIN (0)

## 2024-07-31 NOTE — PATIENT INSTRUCTIONS
Esophageal spasm:  CLINICAL FEATURES   Clinical manifestations -- Most symptomatic patients with NATHALIE or HE present with dysphagia and/or chest pain [7,24]. Patients have esophageal dysphagia, which is characterized by difficulty swallowing and often a sensation of solid food or liquids getting stuck or passing slowly. The dysphagia may localize to the suprasternal notch or chest. Retrosternal, noncardiac chest pain may be the predominant symptom [25]. In some cases, patients have symptoms of heartburn and/or regurgitation, but these symptoms are usually accompanied by either dysphagia and/or noncardiac chest pain.  As an example, in a study of 34 patients with HE, 23 patients (67 percent) reported dysphagia and 16 patients (47 percent) had chest pain [24]. Dysphagia was associated with hypercontractile swallows on manometry, while chest pain was not associated with manometric findings. (See 'Diagnostic criteria' below.)  Gastroesophageal reflux disease may coexist with disorders of esophageal peristalsis. In a series of 108 patients with NATHALIE, 41 patients (34 percent) had pathologic acid reflux demonstrated by either pH monitoring or endoscopy [26]. (See 'Differential diagnosis' below.)    MANAGEMENT   Goals and sequence of therapies -- Because of a range of symptoms, heterogeneity in manometric features, and limited data on natural history and efficacy of therapies, our treatment approach for NATHALIE and HE is based on the following principles:  ?Pretreatment evaluation involves excluding other conditions (eg, eosinophilic esophagitis [muscular variant], esophagogastric junction outflow obstruction [EGJOO], type 3 achalasia, opioid-associated dysmotility). (See 'Diagnosis' above and 'Differential diagnosis' above.)  ?The goal of therapy is to provide relief of symptom(s) rather than normalization of manometric features.  ?If gastroesophageal reflux disease (GERD) has not been excluded with pH testing, we typically begin  "with empiric antisecretory therapy. (See 'Initial measures' below.)  ?Selection among other therapies is informed by the patient's response to prior therapy, symptom type (eg, dysphagia, chest pain), symptom severity, disease progression, risk of adverse effects, and patient preferences. For most patients, medical therapy or endoscopic therapy (eg, botulinum toxin injection) is associated with symptomatic improvement. For patients with limited symptom burden, observation alone may be appropriate. In such cases, clinical follow-up and repeat radiographic and/or manometric testing is reasonable.  Initial measures -- The goals of initial therapy are to control symptoms (eg, heartburn, noncardiac chest pain, regurgitation).  ?Control GERD symptoms - We begin a proton pump inhibitor (PPI) twice daily, especially if patients also have GERD symptoms (table 1). The use of PPIs for the treatment of GERD is discussed in more detail separately. (See \"Medical management of gastroesophageal reflux disease in adults\", section on 'Proton pump inhibitors'.)  For patients who respond to PPI, we continue therapy for three months. We gradually taper PPI therapy for patients who have been on PPI for three months or longer. If symptoms recur with tapering or discontinuing PPI, we restart it. Selection of PPI, duration of therapy, and tapering schedules are discussed separately. (See \"Proton pump inhibitors: Overview of use and adverse effects in the treatment of acid related disorders\", section on 'Administration'.)  For patients with GERD symptoms or chest pain who do not respond to PPI therapy, subsequent management is based on symptoms and ambulatory pH study results. The approach to the evaluation and treatment of patients with refractory GERD is discussed elsewhere. (See \"Approach to refractory gastroesophageal reflux disease in adults\".)  We initially focus on controlling GERD symptoms for the following reasons [33] (see " 'Pathophysiology' above and 'Clinical features' above):  NATHALIE and HE may coexist with GERD [26]. As an example, in a study of 44 patients with HE by esophageal manometry, 19 patients (43 percent) had GERD-related symptoms [34]. In a small series of patients with NATHALIE who were treated with nitrates, clinical improvement was reported only in patients without coexisting GERD [35].  Esophageal dysmotility may be induced by acid reflux, and thus, treatment of GERD may help alleviate symptoms of esophageal dysmotility [15,16].  Some treatments for esophageal hyperperistalsis (ie, smooth muscle relaxants) may exacerbate GERD symptoms.  ?Initial therapy to relax smooth muscle - For mildly symptomatic patients, we typically begin a trial of peppermint oil (two Altoids mints taken sublingually before each meal as needed). We use peppermint oil as initial therapy because it is generally well tolerated with minimal or no side effects and because smooth muscle relaxants can relieve symptoms in patients with hypercontractile esophageal peristalsis [36-38]. Peppermint oil improved manometric abnormalities in a case series of eight patients with NATHALIE [36]. Two patients also had improvement in chest pain.  Therapy for persistent symptoms  Pharmacologic therapy -- For patients with persistent symptoms despite initial measures, we use pharmacologic therapy such as an antispasmodic or antihypertensive agent:  ?Antispasmodic agents - We use an antispasmodic agent (eg, hyoscyamine, dicyclomine) based on clinical experience and limited published data [39,40]. As an example, hyoscyamine 0.25 mg, orally, three times daily as needed was associated with reduced esophageal contractility and lower esophageal sphincter (LES) pressure [39]. Dicyclomine 20 mg, four times daily as needed, modestly reduced esophageal contractile amplitudes in heathy individuals [40].  ?Antihypertensive agents - We typically use a short-acting nitrate (ie, isosorbide  "dinitrate 5 to 10 mg, orally, twice or three times daily), although a calcium channel blocker is a reasonable alternative. For nitrate therapy, we begin with a lower dose (eg, isosorbide dinitrate 5 mg twice daily as needed) and caution patients about possible side effects (eg, headache). If the dose is well tolerated, it can be gradually titrated to achieve symptom response. (See \"Nitrates in the management of chronic coronary syndrome\", section on 'Adverse effects'.)  Although data to support the efficacy of antihypertensive agents for relaxing esophageal smooth muscle are limited, indirect data from studies in patients with achalasia suggested that nitrates provided greater reduction in esophageal contractile amplitudes compared with calcium channel blockers [41]. In addition, in two small trials including patients with NATHALIE, nitrate therapy was associated with greater symptom improvement compared with calcium channel blockers [10,35].  We infrequently use calcium channel blockers for treating esophageal hypercontractility because adverse effects generally limit their use. Adverse effects related to calcium channel blockers include headache, lightheadedness, hypotension, and constipation. However, extended-release formulations of calcium channel blockers may be better tolerated by some patients because of lower risk of adverse effects than the immediate-release formulations. (See \"Major side effects and safety of calcium channel blockers\".)  Data supporting the use of calcium channel blockers are mixed, and some studies included patients with nutcracker esophagus rather than HE [37,38,42]. In a trial of 22 patients with nutcracker esophagus, patients treated with diltiazem (60 to 90 mg four times daily) had greater relief of chest pain compared with placebo [37]. However, in another small trial, nifedipine showed improvement in esophageal contractile amplitudes but no significant difference in symptoms [42]. In a " "study evaluating calcium channel blockers in healthy individuals, nifedipine was associated with greater improvement in esophageal contractile pressure compared with verapamil or diltiazem, both of which had negligible effects [39].  ?Tricyclic antidepressants -- For patients with predominantly chest pain symptoms who cannot tolerate or do not have symptomatic improvement with initial measures and initial pharmacologic therapy, we begin a low-dose tricyclic antidepressant (TCA). The initial dose should be adjusted based upon tolerance and response. Due to the delayed onset of action of antidepressants, three to four weeks of therapy should be attempted before increasing the dose. We typically use either nortriptyline or amitriptyline with an initial dose of 10 to 25 mg daily at bedtime. We discuss potential adverse effects with the patient (ie, orthostatic hypotension, constipation, dry mouth, blurred vision, urinary retention). (See \"Tricyclic and tetracyclic drugs: Pharmacology, administration, and side effects\", section on 'Side effects'.)  We instruct patients to take the TCA as prescribed rather than on an as-needed basis and to expect that response may not occur until four or more weeks have elapsed. If the TCA is well tolerated, we reassess the patient's symptoms after three months of treatment. If symptoms are improved, treatment is continued for 6 to 12 months, followed by a drug taper, which generally takes two to four weeks to avoid discontinuation symptoms (eg, agitation, anxiety, headache). The approach to discontinuing TCAs is discussed separately. (See \"Discontinuing antidepressant medications in adults\", section on 'Tricyclics'.)  "

## 2024-07-31 NOTE — RESULT ENCOUNTER NOTE
Maikel Palmer,    Thank you for your recent office visit.    Here are your recent results.  Normal EKG    Feel free to contact me via [employer]ets[employer] or call the clinic at [phone].    Sincerely,    CHEYENNE Geronimo, FNP-BC

## 2024-07-31 NOTE — PROGRESS NOTES
"  Assessment & Plan     Atypical chest pain    - EKG 12-lead complete w/read - Clinics    Family history of coronary artery disease    - EKG 12-lead complete w/read - Clinics    Current use of estrogen therapy    - EKG 12-lead complete w/read - Clinics      BMI  Estimated body mass index is 25.97 kg/m  as calculated from the following:    Height as of this encounter: 1.817 m (5' 11.54\").    Weight as of this encounter: 85.7 kg (189 lb).         See Patient Instructions: Discussed normal EKG.  Discussed if having chest pain with other symptoms such as pale, sweating, nausea, vomiting, dizziness, shortness of breath call 911.  Discussed likelihood that this is esophageal spasm/GERD.  Tips given on after visit summary.  If symptoms are persisting will order scope.  Follow-up if needed.  Patient in agreement.    Zeyad Mckeon is a 19 year old, presenting for the following health issues:  Chest Pain (Couple weeks ago for 5 minutes intermittent ) and Abdominal Pain (Feels like there is a bubble in stomach- improved over the last few weeks )        7/31/2024     2:44 PM   Additional Questions   Roomed by Autumn   Accompanied by Self     History of Present Illness       Reason for visit:  Chest pain/upper abdominal discomfort  Symptom onset:  3-4 weeks ago  Symptom intensity:  Mild  Symptom progression:  Improving  Had these symptoms before:  No    She eats 0-1 servings of fruits and vegetables daily.She consumes 1 sweetened beverage(s) daily.She exercises with enough effort to increase her heart rate 30 to 60 minutes per day.  She exercises with enough effort to increase her heart rate 5 days per week. She is missing 3 dose(s) of medications per week.  She is not taking prescribed medications regularly due to remembering to take.     Worst symptoms were 2 to 3 weeks ago when driving home from work while eating a sandwich.  Had chest pain near stomach/end of sternum.  Chest pain was sharp and lasted about 5 minutes.  She " "did not need to pull over.  She believes she had some slight dizziness, shortness of breath and anxiety with her symptoms.  She did stop her Zoloft, initially she ran out of medication and then she felt fine being off of it.  She believes she is managing her mental health without medications at this time.  She has not had panic attacks.  She did have history of heartburn as a child  with associated vomiting.  She is unsure if symptoms were similar.  She did not have nausea with her symptoms.  She does not smoke, rare alcohol not associated with symptoms.  Her grandfather has a history of blood clots related to surgery.  Patient is on estrogen.  Heart disease on mother's side.  For 1 week had epigastric discomfort felt like a bubble, worse with laying in certain positions.  Felt like stomach was distended, did not see visual lump.  Symptoms are better.    Chest Pain  Onset/Duration: 2-3 weeks ago  Description:   Location: center of chest  Character: sharp  Radiation: near endo of sternum, stomach  Duration: 5 minutes   Intensity: mild  Progression of Symptoms: improving and intermittent  Accompanying Signs & Symptoms:  Shortness of breath: YES  Sweating: N/A  Nausea/vomiting: No  Lightheadedness: YES- very little  Palpitations: No  Fever/Chills: No  Cough: No           Heartburn: YES  History:   Family history of heart disease: YES  Tobacco use: No  Previous similar symptoms: no   Precipitating factors:   Worse with exertion: No  Worse with deep breaths: No           Related to eating: YES           Better with burping: No  Alleviating factors: none, went away on its own  Therapies tried and outcome: nothing        Review of Systems  Constitutional, HEENT, cardiovascular, pulmonary, GI, , musculoskeletal, neuro, skin, endocrine and psych systems are negative, except as otherwise noted.      Objective    /80   Pulse 116   Temp 97.3  F (36.3  C) (Temporal)   Resp 18   Ht 1.817 m (5' 11.54\")   Wt 85.7 kg " (189 lb)   SpO2 98%   BMI 25.97 kg/m    Body mass index is 25.97 kg/m .  Physical Exam   GENERAL: alert and no distress  EYES: Eyes grossly normal to inspection  RESP: lungs clear to auscultation - no rales, rhonchi or wheezes  CV: regular rate and rhythm, normal S1 S2, no S3 or S4, no murmur, click or rub, no peripheral edema  ABDOMEN: soft, nontender, no hepatosplenomegaly, no masses and bowel sounds normal, no rebound tenderness  MS: no CVA tenderness  NEURO: mentation intact and speech normal  PSYCH: mentation appears normal, affect normal/bright    See orders  Discussed normal EKG.  D        Signed Electronically by: KALLI SAEZ

## 2024-08-06 ENCOUNTER — OFFICE VISIT (OUTPATIENT)
Dept: PSYCHOLOGY | Facility: CLINIC | Age: 19
End: 2024-08-06
Payer: COMMERCIAL

## 2024-08-06 DIAGNOSIS — F33.41 RECURRENT MAJOR DEPRESSION IN PARTIAL REMISSION (H): ICD-10-CM

## 2024-08-06 DIAGNOSIS — F64.0 TRANSSEXUALISM: Primary | ICD-10-CM

## 2024-08-06 PROCEDURE — 90837 PSYTX W PT 60 MINUTES: CPT | Performed by: PSYCHOLOGIST

## 2024-08-06 NOTE — PROGRESS NOTES
"[facility] - Progress Note    Date of Service: 24   Name: Estela Reddy  : 2005  Medical Record Number: [id number]  Treating Provider: Alla Jones LP  Type of Session: Individual  Present in Session: Mike  Session Start and Stop Time: 2:02- 2:55pm  Number of Minutes: 53  Updated DA and Treatment Plan Completed: 24    SERVICE MODALITY:  In Person    DSM-5 Diagnoses:  302.85  F64.0) Gender Dysphoria   (F33.41) Recurrent Major Depression (in remission)    Current Reported Symptoms and Status update:  In terms of Gennder Dysphoria, Mike's social dysphoria symptoms continue to lessen as client has grown into adulthood and exiting the high school environment where everyone knew about trans identity.  Is now much less anxious and more relaxed in public as \"just being able to be is such a weight off my shoulders.\"   No longer feels like her interactions are \"colored by my trans identity\".  Can now live my life separate from my trans-identity which \"feels freeing as well as a betrayal to the trans community.\"       Body Dysphoria: Client reports not \"terrible but not great either. It isn't constantly occupying my mind.\"  Is pleased with breast growth and is not necessarily expecting any more so is satisfied and \"grateful with what I have.\"  Genital dysphoria is still present and is interested in pursuing vaginoplasty at some point.  Is recognizing that she does compare herself negatively with other women and wonders if this is why she has not had any sexual encounters with women even though she is attracted to them.     In terms of anxiety, has some typical level of \"adulting\" fears such as moving out of parental home, finances (is working fulltime).  Also experiencing ongoing stress related to parents divorce process including worry about about mother's alcohol intake.  Since being a  and experiencing rude behavior, recognizes that she has some anxiety about wanting " "to be well-liked as a customer. Contin[place] have [place]       In terms of depression, still experiencing some symptoms (feeling down, sleep disruption and fatigue) but feels it is manageable rather than debilitating.     Changes since last session: some anxiety about \"adulting\" and having a \"plan for my life\"; some embarrassment at not being as \"accomplished\" as she thinks she should be; anxiety about talking with community     Progress Toward Treatment Goals:   Satisfactory progress     Therapeutic Interventions/Treatment Strategies:    Area(s) of treatment focus addressed in this session included Symptom Management, Gender Health, Develop / Improve Independent Living Skills and Maintenance of Progress    Supportive Techniques designed to provide a safe place for Mike to explore gender-related issues as well as the anxiety/depression symptoms she experiences related to stressors around adulting and relational difficulties. Mike shared with pride that she believes she is back on track with her medical gender-related care.  Processed her anxiety about adulting and the ways she is putting anxiety on herself about career and college expectations.  Used cognitive restructuring to prepare her to face her community  who she has been avoiding.  Provided empathy and normalized this developmental phase of life for her.  She reported that it was very helpful for her to hang out with people her age (usually is around much older people) as she figured out that they are struggling also with similar anxieties around adulting.      Psychotherapist offered support, feedback and validation and reinforced skills.       Patient Response:   Patient responded to session by listening, accepting support, verbalizing understanding and actively engaged  Possible barriers to participation / learning include: N/A    Current Mental Status Exam:   Appearance:  Appropriate   Eye " Contact:  Good   Attitude / Demeanor: Cooperative  Interested Playful Friendly Pleasant  Speech      Rate / Production: Normal/ Responsive      Volume:  Normal  volume  Orientation:  All  Mood:   Positive and Anxious  Affect:   Appropriate to context  Thought Content: Clear   Insight:   Good       Plan/Need for Future Services:  Return for therapy in 4 weeks to treat diagnosed problems.    Patient has a current master individualized treatment plan.  See Epic treatment plan for more information.    Referral / Collaboration:  Referral to another professional/service is not indicated at this time.  Emergency Services Needed?  No    Assignment:  NEW: go talk with advisor about potential for some career counseling/interest exploration, etc.    Interactive Complexity:  There are four specific communication difficulties that complicate the work of the primary psychiatric procedure.  Interactive complexity (+[id number]) may be reported when at least one of these difficulties is present.    Communication difficulties present during current the psychiatric procedure include:  None.      Signature/Title:    Alla Jones LP

## 2024-08-12 ENCOUNTER — OFFICE VISIT (OUTPATIENT)
Dept: FAMILY MEDICINE | Facility: CLINIC | Age: 19
End: 2024-08-12
Payer: COMMERCIAL

## 2024-08-12 VITALS
SYSTOLIC BLOOD PRESSURE: 113 MMHG | HEART RATE: 100 BPM | BODY MASS INDEX: 25.77 KG/M2 | DIASTOLIC BLOOD PRESSURE: 75 MMHG | WEIGHT: 187.6 LBS

## 2024-08-12 DIAGNOSIS — Z79.899 TRANSGENDER PERSON ON HORMONE THERAPY: ICD-10-CM

## 2024-08-12 DIAGNOSIS — F64.0 TRANSGENDER PERSON ON HORMONE THERAPY: ICD-10-CM

## 2024-08-12 PROCEDURE — 99214 OFFICE O/P EST MOD 30 MIN: CPT | Performed by: FAMILY MEDICINE

## 2024-08-12 RX ORDER — SPIRONOLACTONE 50 MG/1
TABLET, FILM COATED ORAL
Qty: 194 TABLET | Refills: 0 | Status: SHIPPED | OUTPATIENT
Start: 2024-08-12 | End: 2024-11-24

## 2024-08-12 RX ORDER — ESTRADIOL 2 MG/1
6 TABLET ORAL DAILY
Qty: 270 TABLET | Refills: 1 | Status: SHIPPED | OUTPATIENT
Start: 2024-08-12

## 2024-08-12 NOTE — PROGRESS NOTES
NOLVIA Mckeon is a 19 year old individual that uses pronouns She/Her/Hers/Herself that presents today for follow up of:  feminizing hormone therapy.   Alone or accompanied by: accompanied today by  Gender identity: female  Started Hormone  therapy  puberty suppressoin 2016, hormone start 2019  Continues on Estrace 4 mg daily   Any special concerns today?    No insurance coverage for Lupron  No other concerns, would like to start spironolactone today--has been off Lupron for a while  On hormones?  YES +++ Shot day of the week? Not applicable-taking pills/patch/gel      Due for labs?  Yes      +++ Refills of meds needed?  Yes  Gender related body changes since last visit:   More hair noticeable on face    Breakthrough bleeding? Does Not Apply    New health concerns since last visit:  ---none    No past surgical history on file.    Patient Active Problem List   Diagnosis    Gender dysphoria in adolescent and adult    Vasovagal syncope    Current mild episode of major depressive disorder without prior episode (H24)    Atypical chest pain    Family history of coronary artery disease    Current use of estrogen therapy       Current Outpatient Medications   Medication Sig Dispense Refill    estradiol (ESTRACE) 2 MG tablet Take 2 tablets (4 mg) by mouth daily 180 tablet 0       History   Smoking Status    Never   Smokeless Tobacco    Never        No Known Allergies    There are no preventive care reminders to display for this patient.      Problem, Medication and Allergy Lists were reviewed and are current..         Review of Systems:   Review of Systems           Labs:   Results from last visit:  Lab on 05/06/2024   Component Date Value Ref Range Status    Sodium 05/06/2024 140  135 - 145 mmol/L Final    Reference intervals for this test were updated on 09/26/2023 to more accurately reflect our healthy population. There may be differences in the flagging of prior results with similar values performed with this  "method. Interpretation of those prior results can be made in the context of the updated reference intervals.     Potassium 05/06/2024 4.3  3.4 - 5.3 mmol/L Final    Chloride 05/06/2024 104  98 - 107 mmol/L Final    Carbon Dioxide (CO2) 05/06/2024 25  22 - 29 mmol/L Final    Anion Gap 05/06/2024 11  7 - 15 mmol/L Final    Urea Nitrogen 05/06/2024 11.6  6.0 - 20.0 mg/dL Final    Creatinine 05/06/2024 0.84  0.51 - 1.17 mg/dL Final    Male and Female  0-2 Months    0.31-0.88 mg/dL  2-12 Months   0.16-0.39 mg/dL  1-2 Years     0.18-0.35 mg/dL  3-4 Years     0.26-0.42 mg/dL  5-6 Years     0.29-0.47 mg/dL  7-8 Years     0.34-0.53 mg/dL  9-10 Years    0.33-0.64 mg/dL  11-12 Years   0.44-0.68 mg/dL  13-14 Years   0.46-0.77 mg/dL    Female  15 Years and older  0.51-0.95 mg/dL    Male  15 Years and older  0.67-1.17 mg/dL        GFR Estimate 05/06/2024 >90  >60 mL/min/1.73m2 Final    The generation of the estimated GFR is currently based on binary male or female sex. If the electronic health record information indicates another gender identity or if Legal Sex is recorded as \"Unknown\", GFR estimates are not automatically calculated, and application of GFR equations or a direct GFR measurement should be considered according to the individual's appropriate clinical context.    Calcium 05/06/2024 9.7  8.6 - 10.0 mg/dL Final    Glucose 05/06/2024 91  70 - 99 mg/dL Final    Estradiol 05/06/2024 47  pg/mL Final    Healthy Men:   11.3-43.2 pg/mL    Healthy Postmenopausal Women:  Postmenopause: <5-138 pg/mL    Healthy Pregnant Women:  1st trimester: 154-3243 pg/mL  2nd trimester: 1561-16768 pg/mL  3rd trimester: 8525->36438 pg/mL    Healthy Women Cycle Phase:  Follicular: 30.9-90.4 pg/mL  Ovulation: 60.4-533 pg/mL  Luteal: 60.4-232 pg/mL    Healthy Women Cycle Sub-Phase:  Early Follicular: 20.5-62.8 pg/mL  Intermediate Follicular: 26-79.8 pg/mL  Late Follicular: 49.5-233 pg/mL  Ovulation: 60.4-602 pg/mL  Early Luteal: 51.1-179 " pg/mL  Intermediate Luteal: 66.5-305 pg/mL  Late Luteal: 30.2-222 pg/mL    Vitamin D, Total (25-Hydroxy) 05/06/2024 26  20 - 50 ng/mL Final    optimum levels    Sex Hormone Binding Globulin 05/06/2024 29  11 - 135 nmol/L Final    Female Reference Range:  Guilherme Stage I:  nmol/L  Guilherme Stage II:  nmol/L  Guilherme Stage III: 12-98 nmol/L  Guilherme Stage IV:  nmol/L  Guilherme Stage V:  nmol/L    Male Reference Range:  Guilherme Stage I:  nmol/L  Guilherme Stage II:  nmol/L  Guilherme Stage III:  nmol/L  Guilherme Stage IV: 11-60 nmol/L  Guilherme Stage V: 11-71 nmol/L    Free Testosterone Calculated 05/06/2024 10.16  ng/dL Final    Testosterone Total 05/06/2024 456  8 - 950 ng/dL Final    Comment:   MALE:  0 to 5 months:  ng/dL  6 months to 9 years: <2-20 ng/dL  10 to 11 years: <2-130 ng/dL  12 to 13 years: <2-800 ng/dL  14 years: <2-1200 ng/dL  15 to 16 years: 100-1200 ng/dL  17 to 18 years: 300-1200 ng/dL  19 years and older: 240-950 ng/dL    FEMALE:  0 to 5 months: 20-80 ng/dL  6 months to 9 years: <2-20 ng/dL  10 to 11 years: <2-44 ng/dL  12 to 16 years: <2-75 ng/dL  17 to 18 years: 20-75 ng/dL  19 years and older: 8-60 ng/dL    Values by Guilherme Stage:  Stage I (prepubertal)  Male: <2 to 20 ng/dL  Female: <2 to 20 ng/dL    Stage II  Male: 8 to 66 ng/dL  Female: <2 to 47 ng/dL    Stage III  Male: 26 to 800 ng/dL  Female: 17 to 75 ng/dL    Stage IV  Male: 85 to 1200 ng/dL  Female: 20 to 75 ng/dL    Stage V (young adult)  Male: 300 to 950 ng/dL  Female: 12 to 60 ng/dL    Puberty onset (transition from Guilherme Stage I to Guilherme Stage II) occurs for boys at a median age of 11.5 years and for girls at median age of 10.5 years. There is evidence that it may occur up to 1 year                            earlier in obese girls and in  girls. For boys there is no definite proven relationship between puberty onset and body weight or ethnic origin. Progression through Guilherme stages is  variable. Guilherme Stage V (young adult) should be reached by age 18.             EXAM:  Vitals reviewed   Constitutional: healthy, alert, and no distress   Psychiatric: mentation appears normal and affect normal/bright     Assessment and Plan   Transgender person on hormone therapy  Reviewed May labs with patient  Increase estradiol to 6 mg daily, start spironalactone 50 mg daily fo 2 weeks, then 100 mg daily after that  Labs: BMP, estradiol, testosterone in 1  month    Follow-up 4 months      Results by mychart  Questions were elicited and answered.     Hong Rodriguez MD

## 2024-08-14 ENCOUNTER — ANCILLARY PROCEDURE (OUTPATIENT)
Dept: BONE DENSITY | Facility: CLINIC | Age: 19
End: 2024-08-14
Attending: FAMILY MEDICINE
Payer: COMMERCIAL

## 2024-08-14 DIAGNOSIS — F64.0 TRANSGENDER PERSON ON HORMONE THERAPY: ICD-10-CM

## 2024-08-14 DIAGNOSIS — Z79.899 TRANSGENDER PERSON ON HORMONE THERAPY: ICD-10-CM

## 2024-08-14 PROCEDURE — 77080 DXA BONE DENSITY AXIAL: CPT | Mod: TC | Performed by: PHYSICIAN ASSISTANT

## 2024-08-15 ENCOUNTER — PATIENT OUTREACH (OUTPATIENT)
Dept: CARE COORDINATION | Facility: CLINIC | Age: 19
End: 2024-08-15
Payer: COMMERCIAL

## 2024-08-15 NOTE — PROGRESS NOTES
Clinic Care Coordination Contact  [facility]/Voicemail    Clinical Data: Care Coordinator Outreach    Outreach Documentation Number of Outreach Attempt   7/16/2024   2:17 PM 3   8/15/2024  11:17 AM 3       Left message on patient's voicemail with call back information and requested return call.    Plan: Care Coordinator sent unable to contact letter on 7/16/24 via [employer]. Care Coordinator will do no further outreaches at this time.    BETSEY Rodriguez  Social Work Care Coordinator  [facility]  [phone]  [email or web address]  Pronouns: They/He

## 2024-10-11 ASSESSMENT — PATIENT HEALTH QUESTIONNAIRE - PHQ9: SUM OF ALL RESPONSES TO PHQ QUESTIONS 1-9: 4

## 2024-12-03 ENCOUNTER — OFFICE VISIT (OUTPATIENT)
Dept: FAMILY MEDICINE | Facility: CLINIC | Age: 19
End: 2024-12-03
Payer: COMMERCIAL

## 2024-12-03 ENCOUNTER — OFFICE VISIT (OUTPATIENT)
Dept: PSYCHOLOGY | Facility: CLINIC | Age: 19
End: 2024-12-03
Payer: COMMERCIAL

## 2024-12-03 VITALS
WEIGHT: 184 LBS | BODY MASS INDEX: 24.92 KG/M2 | SYSTOLIC BLOOD PRESSURE: 133 MMHG | HEART RATE: 87 BPM | DIASTOLIC BLOOD PRESSURE: 80 MMHG | HEIGHT: 72 IN

## 2024-12-03 DIAGNOSIS — F33.41 RECURRENT MAJOR DEPRESSION IN PARTIAL REMISSION (H): Primary | ICD-10-CM

## 2024-12-03 DIAGNOSIS — Z79.899 TRANSGENDER PERSON ON HORMONE THERAPY: ICD-10-CM

## 2024-12-03 DIAGNOSIS — F64.0 TRANSGENDER PERSON ON HORMONE THERAPY: ICD-10-CM

## 2024-12-03 DIAGNOSIS — F64.0 TRANSSEXUALISM: ICD-10-CM

## 2024-12-03 PROCEDURE — 99214 OFFICE O/P EST MOD 30 MIN: CPT | Performed by: FAMILY MEDICINE

## 2024-12-03 RX ORDER — ESTRADIOL 2 MG/1
6 TABLET ORAL DAILY
Qty: 270 TABLET | Refills: 1 | Status: SHIPPED | OUTPATIENT
Start: 2024-12-03 | End: 2024-12-11 | Stop reason: ALTCHOICE

## 2024-12-03 RX ORDER — SPIRONOLACTONE 50 MG/1
50 TABLET, FILM COATED ORAL DAILY
Qty: 180 TABLET | Refills: 0 | Status: SHIPPED | OUTPATIENT
Start: 2024-12-03

## 2024-12-03 NOTE — PROGRESS NOTES
CHRIS Mckeon is a 19 year old individual that uses pronouns She/Her/Hers/Herself that presents today for follow up of:  feminizing hormone therapy.   Alone or accompanied by: accompanied today by  Gender identity: female  Started Hormone  therapy   puberty suppressoin 2016, hormone start 2019   Continues on Estrace 6* mg daily  spironolactone 100 mg daily  Any special concerns today?    No new concerns, GAHT going well  Taking medications regularly  No hair growth, no AM erections      On hormones?  YES +++ Shot day of the week? Not applicable-taking pills/patch/gel      Due for labs?  No      +++ Refills of meds needed?  Yes  Gender related body changes since last visit:   Stable    Breakthrough bleeding? Does Not Apply    New health concerns since last visit:  ---none    No past surgical history on file.    Patient Active Problem List   Diagnosis    Gender dysphoria in adolescent and adult    Vasovagal syncope    Current mild episode of major depressive disorder without prior episode (H)    Atypical chest pain    Family history of coronary artery disease    Current use of estrogen therapy       Current Outpatient Medications   Medication Sig Dispense Refill    estradiol (ESTRACE) 2 MG tablet Take 3 tablets (6 mg) by mouth daily 270 tablet 1    spironolactone (ALDACTONE) 50 MG tablet Take 1 tablet (50 mg) by mouth daily for 14 days, THEN 1 tablet (50 mg) 2 times daily for 90 days. 194 tablet 0       History   Smoking Status    Never   Smokeless Tobacco    Never        No Known Allergies    There are no preventive care reminders to display for this patient.      Problem, Medication and Allergy Lists were reviewed and are current..         Review of Systems:   Review of Systems           Labs:   Results from last visit:  Lab on 11/29/2024   Component Date Value Ref Range Status    Sodium 11/29/2024 139  135 - 145 mmol/L Final    Potassium 11/29/2024 3.8  3.4 - 5.3 mmol/L Final    Chloride 11/29/2024 101  98 -  "107 mmol/L Final    Carbon Dioxide (CO2) 11/29/2024 23  22 - 29 mmol/L Final    Anion Gap 11/29/2024 15  7 - 15 mmol/L Final    Urea Nitrogen 11/29/2024 10.4  6.0 - 20.0 mg/dL Final    Creatinine 11/29/2024 0.70  0.51 - 1.17 mg/dL Final    Male and Female  0-2 Months    0.31-0.88 mg/dL  2-12 Months   0.16-0.39 mg/dL  1-2 Years     0.18-0.35 mg/dL  3-4 Years     0.26-0.42 mg/dL  5-6 Years     0.29-0.47 mg/dL  7-8 Years     0.34-0.53 mg/dL  9-10 Years    0.33-0.64 mg/dL  11-12 Years   0.44-0.68 mg/dL  13-14 Years   0.46-0.77 mg/dL    Female  15 Years and older  0.51-0.95 mg/dL    Male  15 Years and older  0.67-1.17 mg/dL        GFR Estimate 11/29/2024 >90  >60 mL/min/1.73m2 Final    The generation of the estimated GFR is currently based on binary male or female sex. If the electronic health record information indicates another gender identity or if Legal Sex is recorded as \"Unknown\", GFR estimates are not automatically calculated, and application of GFR equations or a direct GFR measurement should be considered according to the individual's appropriate clinical context.  eGFR calculated using 2021 CKD-EPI equation.    Calcium 11/29/2024 9.6  8.8 - 10.4 mg/dL Final    Reference intervals for this test were updated on 7/16/2024 to reflect our healthy population more accurately. There may be differences in the flagging of prior results with similar values performed with this method. Those prior results can be interpreted in the context of the updated reference intervals.    Glucose 11/29/2024 108 (H)  70 - 99 mg/dL Final    Estradiol 11/29/2024 54  pg/mL Final    Healthy Men:   11.3-43.2 pg/mL    Healthy Postmenopausal Women:  Postmenopause: <5-138 pg/mL    Healthy Pregnant Women:  1st trimester: 154-3243 pg/mL  2nd trimester: 1561-89216 pg/mL  3rd trimester: 8525->28118 pg/mL    Healthy Women Cycle Phase:  Follicular: 30.9-90.4 pg/mL  Ovulation: 60.4-533 pg/mL  Luteal: 60.4-232 pg/mL    Healthy Women Cycle " "Sub-Phase:  Early Follicular: 20.5-62.8 pg/mL  Intermediate Follicular: 26-79.8 pg/mL  Late Follicular: 49.5-233 pg/mL  Ovulation: 60.4-602 pg/mL  Early Luteal: 51.1-179 pg/mL  Intermediate Luteal: 66.5-305 pg/mL  Late Luteal: 30.2-222 pg/mL    Sex Hormone Binding Globulin 11/29/2024 59  11 - 135 nmol/L Final    Female Reference Range:  Guilherme Stage I:  nmol/L  Guilherme Stage II:  nmol/L  Guilherme Stage III: 12-98 nmol/L  Guilherme Stage IV:  nmol/L  Guilherme Stage V:  nmol/L    Male Reference Range:  Guilherme Stage I:  nmol/L  Guilherme Stage II:  nmol/L  Guilherme Stage III:  nmol/L  Guilherme Stage IV: 11-60 nmol/L  Guilherme Stage V: 11-71 nmol/L   Testosterone pending  Medication taken before bed at night, labs were drawn at 2pm        EXAM:  Blood pressure 133/80, pulse 87, height 1.816 m (5' 11.5\"), weight 83.5 kg (184 lb), not currently breastfeeding.  Body mass index is 25.31 kg/m .    Vitals reviewed  Constitutional: healthy, alert, and no distress   Cardiovascular: negative, PMI normal. No lifts, heaves, or thrills. RRR. No murmurs, clicks gallops or rub  Respiratory: negative, Percussion normal. Good diaphragmatic excursion. Lungs clear  Psychiatric: mentation appears normal and affect normal/bright     Assessment and Plan   Transgender person on hormone therapy  Clinically stable response to current gender affirming hormone regimen.    Reviewed labs with patient; unclear why estradiol level continues low.   Await testosterone level. If not suppressed, would need to change to TD or injectable estradiol  If suppressed, repeat estradiol at earlier in the day lab      Follow up:  Follow up in 6 months.        Results by mychart  Questions were elicited and answered.     Hong Rodriguez MD    "

## 2024-12-03 NOTE — PROGRESS NOTES
"[facility] - Progress Note    Date of Service: 24   Name: Estela Reddy  : 2005  Medical Record Number: [id number]  Treating Provider: Alla Jones LP  Type of Session: Individual  Present in Session: Mike  Session Start and Stop Time: 2:04- 2:50pm  Number of Minutes: 46  Updated DA and Treatment Plan Completed: 24    SERVICE MODALITY:  In Person    DSM-5 Diagnoses:  302.85  F64.0) Gender Dysphoria   (F33.41) Recurrent Major Depression (in remission)    Current Reported Symptoms and Status update:  In terms of Gennder Dysphoria, Mike's social dysphoria symptoms continue to lessen as client has grown into adulthood and exiting the high school environment where everyone knew about trans identity.  Is now much less anxious and more relaxed in public as \"just being able to be is such a weight off my shoulders.\"   No longer feels like her interactions are \"colored by my trans identity\".  Can now live my life separate from my trans-identity which \"feels freeing as well as a betrayal to the trans community.\"       Body Dysphoria: Client reports not \"terrible but not great either. It isn't constantly occupying my mind.\"  Is pleased with breast growth and is not necessarily expecting any more so is satisfied and \"grateful with what I have.\"  Genital dysphoria is still present and is interested in pursuing vaginoplasty at some point.  Is recognizing that she does compare herself negatively with other women and wonders if this is why she has not had any sexual encounters with women even though she is attracted to them.     In terms of anxiety, has some typical level of \"adulting\" fears such as moving out of parental home, finances (is working fulltime).  Also experiencing ongoing stress related to parents divorce process including worry about about mother's alcohol intake.  Since being a  and experiencing rude behavior, recognizes that she has some anxiety about " "wanting to be well-liked as a customer. [place]       In terms of depression, still experiencing some symptoms (feeling down, sleep disruption and fatigue) but feels it is manageable rather than debilitating.     Changes since last session: continues to do well at work; feeling better about parental divorce; working on figuring out fulfillment    Progress Toward Treatment Goals:   Satisfactory progress     Therapeutic Interventions/Treatment Strategies:    Area(s) of treatment focus addressed in this session included Symptom Management, Gender Health, Develop / Improve Independent Living Skills and Maintenance of Progress    Supportive Techniques designed to provide a safe place for Mike to explore gender-related issues as well as the anxiety/depression symptoms she experiences related to stressors around adulting and relational difficulties. Processed changes that are happening in her life due to parental divorce and how she is coping with these and upcoming changes.  Processed some negative self beliefs about \"being behind\" others her age due to decision to not attend college.  Considering how she can realistically get her own place to move toward adulting by the end of the year.      Psychotherapist offered support, feedback and validation and reinforced skills.       Patient Response:   Patient responded to session by listening, accepting support, verbalizing understanding and actively engaged  Possible barriers to participation / learning include: N/A    Current Mental Status Exam:   Appearance:  Appropriate   Eye Contact:  Good   Attitude / Demeanor: Cooperative  Interested Playful Friendly Pleasant  Speech      Rate / Production: Normal/ Responsive      Volume:  Normal  volume  Orientation:  All  Mood:   Positive and Reflective  Affect:   Appropriate to context  Thought Content: Clear   Insight:   Good       Plan/Need for Future Services:  Return for therapy " in 4 weeks to treat diagnosed problems.    Patient has a current master individualized treatment plan.  See Epic treatment plan for more information.    Referral / Collaboration:  Referral to another professional/service is not indicated at this time.  Emergency Services Needed?  No    Assignment:  None this session    Interactive Complexity:  There are four specific communication difficulties that complicate the work of the primary psychiatric procedure.  Interactive complexity (+[id number]) may be reported when at least one of these difficulties is present.    Communication difficulties present during current the psychiatric procedure include:  None.      Signature/Title:    Alla Jones LP

## 2024-12-11 DIAGNOSIS — F64.0 TRANSGENDER PERSON ON HORMONE THERAPY: Primary | ICD-10-CM

## 2024-12-11 DIAGNOSIS — Z79.899 TRANSGENDER PERSON ON HORMONE THERAPY: Primary | ICD-10-CM

## 2024-12-11 RX ORDER — ESTRADIOL 0.1 MG/D
2 FILM, EXTENDED RELEASE TRANSDERMAL
Qty: 16 PATCH | Refills: 3 | Status: SHIPPED | OUTPATIENT
Start: 2024-12-12

## 2025-02-04 ENCOUNTER — OFFICE VISIT (OUTPATIENT)
Dept: PSYCHOLOGY | Facility: CLINIC | Age: 20
End: 2025-02-04
Payer: COMMERCIAL

## 2025-02-04 DIAGNOSIS — F33.41 RECURRENT MAJOR DEPRESSION IN PARTIAL REMISSION: Primary | ICD-10-CM

## 2025-02-04 DIAGNOSIS — F64.0 TRANSSEXUALISM: ICD-10-CM

## 2025-02-04 PROCEDURE — 90834 PSYTX W PT 45 MINUTES: CPT | Performed by: PSYCHOLOGIST

## 2025-02-04 NOTE — PROGRESS NOTES
"[facility] - Progress Note    Date of Service: 25   Name: Estela Reddy  : 2005  Medical Record Number: [id number]  Treating Provider: Alla Jones LP  Type of Session: Individual  Present in Session: Mike  Session Start and Stop Time: 2:04- 2:50pm  Number of Minutes: 46  Updated DA and Treatment Plan Completed: 24    SERVICE MODALITY:  In Person    DSM-5 Diagnoses:  302.85  F64.0) Gender Dysphoria   (F33.41) Recurrent Major Depression (in remission)    Current Reported Symptoms and Status update:  In terms of Gennder Dysphoria, Mike's social dysphoria symptoms continue to lessen as client has grown into adulthood and exiting the high school environment where everyone knew about trans identity.  Is now much less anxious and more relaxed in public as \"just being able to be is such a weight off my shoulders.\"   No longer feels like her interactions are \"colored by my trans identity\".  Can now live my life separate from my trans-identity which \"feels freeing as well as a betrayal to the trans community.\"       Body Dysphoria: Client reports not \"terrible but not great either. It isn't constantly occupying my mind.\"  Is pleased with breast growth and is not necessarily expecting any more so is satisfied and \"grateful with what I have.\"  Genital dysphoria is still present and is interested in pursuing vaginoplasty at some point.  Is recognizing that she does compare herself negatively with other women and wonders if this is why she has not had any sexual encounters with women even though she is attracted to them.     In terms of anxiety, has some typical level of \"adulting\" fears such as moving out of parental home, finances (is working fulltime).  Also experiencing ongoing stress related to parents divorce process including worry about about mother's alcohol intake.  Since being a  and experiencing rude behavior, recognizes that she has some anxiety about wanting " to be well-liked as a customer. [place]       In terms of depression, still experiencing some symptoms (feeling down, sleep disruption and fatigue) but feels it is manageable rather than debilitating.     Changes since last session: continues to do well at work; feeling better about parental divorce; working on figuring out fulfillment    Progress Toward Treatment Goals:   Satisfactory progress     Therapeutic Interventions/Treatment Strategies:    Area(s) of treatment focus addressed in this session included Symptom Management, Gender Health, Develop / Improve Independent Living Skills and Maintenance of Progress    Supportive Techniques designed to provide a safe place for Mike to explore gender-related issues as well as the anxiety/depression symptoms she experiences related to stressors around adulting and relational difficulties. Processed the progress she is making towards independence and separation from parental home. Reinforced the steps she has taken to spend time with peers her own age and the energy that this has brought her -- recognizing now that she had been lonely and wasn't sure why she cut herself off from high school friends upon graduation. Able to look at what types of activities and social interactions actually bring her fulfillment rather than what she thought she was supposed to do that for her.       Psychotherapist offered support, feedback and validation and reinforced skills.       Patient Response:   Patient responded to session by listening, accepting support, verbalizing understanding and actively engaged  Possible barriers to participation / learning include: N/A    Current Mental Status Exam:   Appearance:  Appropriate   Eye Contact:  Good   Attitude / Demeanor: Cooperative  Interested Playful Friendly Pleasant  Speech      Rate / Production: Normal/ Responsive      Volume:  Normal  volume  Orientation:  All  Mood:   Positive and  Reflective  Affect:   Appropriate to context  Thought Content: Clear   Insight:   Good       Plan/Need for Future Services:  Return for therapy in 4 weeks to treat diagnosed problems.    Patient has a current master individualized treatment plan.  See Epic treatment plan for more information.    Referral / Collaboration:  Referral to another professional/service is not indicated at this time.  Emergency Services Needed?  No    Assignment:  None this session    Interactive Complexity:  There are four specific communication difficulties that complicate the work of the primary psychiatric procedure.  Interactive complexity (+[id number]) may be reported when at least one of these difficulties is present.    Communication difficulties present during current the psychiatric procedure include:  None.      Signature/Title:    Alla Jones LP

## 2025-04-01 ENCOUNTER — OFFICE VISIT (OUTPATIENT)
Dept: PSYCHOLOGY | Facility: CLINIC | Age: 20
End: 2025-04-01
Payer: COMMERCIAL

## 2025-04-01 DIAGNOSIS — F32.0 MILD MAJOR DEPRESSION: Primary | ICD-10-CM

## 2025-04-01 DIAGNOSIS — F64.0 TRANSSEXUALISM: ICD-10-CM

## 2025-04-01 ASSESSMENT — PATIENT HEALTH QUESTIONNAIRE - PHQ9
SUM OF ALL RESPONSES TO PHQ QUESTIONS 1-9: 8
SUM OF ALL RESPONSES TO PHQ QUESTIONS 1-9: 8
10. IF YOU CHECKED OFF ANY PROBLEMS, HOW DIFFICULT HAVE THESE PROBLEMS MADE IT FOR YOU TO DO YOUR WORK, TAKE CARE OF THINGS AT HOME, OR GET ALONG WITH OTHER PEOPLE: SOMEWHAT DIFFICULT

## 2025-04-01 NOTE — PROGRESS NOTES
"[facility] - Progress Note    Date of Service: 25   Name: Estela Reddy  : 2005  Medical Record Number: [id number]  Treating Provider: Alla Jones LP  Type of Session: Individual  Present in Session: Mike  Session Start and Stop Time: 2:01- 2:55pm  Number of Minutes: 54  Updated DA and Treatment Plan Completed: 24  Updated Treatment Plan: 25    SERVICE MODALITY:  In Person    DSM-5 Diagnoses:  302.85  F64.0) Gender Dysphoria   (F33.41) Recurrent Major Depression   Adjustment Disorder with Anxiety    Current Reported Symptoms and Status update:  In terms of Gender Dysphoria, Mike's social dysphoria symptoms continue to lessen as client has grown into adulthood and exiting the high school environment where everyone knew about trans identity.  Is now much less anxious and more relaxed in public as \"just being able to be is such a weight off my shoulders.\"   No longer feels like her interactions are \"colored by my trans identity\".  Can now live my life separate from my trans-identity which \"feels freeing as well as a betrayal to the trans community.\"       Body Dysphoria: Client reports not \"terrible but not great either. It isn't constantly occupying my mind.\"  Is pleased with breast growth and is not necessarily expecting any more so is satisfied and \"grateful with what I have.\"  Genital dysphoria is still present and is interested in pursuing vaginoplasty at some point.  Is recognizing that she does compare herself negatively with other women and wonders if this is why she has not had any sexual encounters with women even though she is attracted to them.     In terms of anxiety, has some typical level of \"adulting\" fears such as moving out of parental home, finances (is working fulltime).  Also experiencing ongoing stress related to parents divorce process including worry about about mother's alcohol intake.  Since being a  and experiencing rude behavior, " recognizes that she has some anxiety about wanting to be well-liked as a customer. Contin[place] have [place].       In terms of depression, still experiencing some symptoms (feeling down, sleep disruption and fatigue) but feels it is manageable rather than debilitating.     Changes since last session: continues to do well at work; is recognizing some potential mood fluctuation and instability.     Progress Toward Treatment Goals:   Satisfactory progress     Therapeutic Interventions/Treatment Strategies:    Area(s) of treatment focus addressed in this session included Symptom Management, Gender Health, Develop / Improve Independent Living Skills and Maintenance of Progress    Supportive Techniques designed to provide a safe place for Mike to explore gender-related issues as well as the anxiety/depression symptoms she experiences related to stressors around adulting and relational difficulties. First, Mike participated in a discussion and update of her treatment plan.  Processed some mood fluctuation that Mike is experiencing.  Had her describe what she means by highs and lows which were not extreme but bothersome.  Discussed potential referral to in-house psychiatric provider which she welcomed. Encouraged continued movement towards independence.      Psychotherapist offered support, feedback and validation and reinforced skills.       Patient Response:   Patient responded to session by listening, accepting support, verbalizing understanding and actively engaged  Possible barriers to participation / learning include: N/A    Current Mental Status Exam:   Appearance:  Appropriate   Eye Contact:  Good   Attitude / Demeanor: Cooperative  Interested Playful Friendly Pleasant  Speech      Rate / Production: Normal/ Responsive      Volume:  Normal  volume  Orientation:  All  Mood:   Positive and Reflective  Affect:   Appropriate to context  Thought Content: Clear   Insight:   Good        Plan/Need for Future Services:  Return for therapy in 4 weeks to treat diagnosed problems.    Patient has a current master individualized treatment plan.  See Epic treatment plan for more information.    Referral / Collaboration:  Referral to another professional/service is not indicated at this time.  Emergency Services Needed?  No    Assignment:  None this session    Interactive Complexity:  There are four specific communication difficulties that complicate the work of the primary psychiatric procedure.  Interactive complexity (+[id number]) may be reported when at least one of these difficulties is present.    Communication difficulties present during current the psychiatric procedure include:  None.      Signature/Title:    Alla Jones LP        [facility]:  Functional Assessment and Individualized Treatment Plan        Date of Assessment and Plan: 25  Name: Estela Reddy MRN: [id number]  : 2005     DSM5 Diagnoses:    302.85  F64.0) Gender Dysphoria   (F33.41) Recurrent Major Depression   R/O Bipolar 2 Disorder -- have made in-house referral to  psychiatrist to assess this.     PROMIS:   Total - Subscores = 26  Global Mental Health Score = 10 = T-Score = 38.8 (fair)  Global Physical Health Score = 16 = T-score = 50.8 (very good)    PHQ 9: 8 (mild depression)  AMMON-7: 4   CAGE: 0     Functional Assessment  Assess and document how the client's symptoms (associated with above diagnoses) impact the client's functioning in the following areas. Assign 0 - 3 for each of the 10 areas of impact listed below. For anything with a 1 or higher, provide one or two sentences to explain.    0: No Impact; 1: Minimal Impact; 2: Moderate Impact; 3: Severe Impact     (i) the client's co-occurring mental health symptoms: 2  Client has had a history of anxiety and depressive symptoms but not been on medication for about a year.  Recently, noticed some mood swings that are distressing to  "her with the depressive part being more pronounced than the \"feeling good\" part.  (ii) the client's mental health service needs: 0  (iii) the client's substance use: 0  (iv) the client's vocational and educational functionin  (v) the client's social functioning, including the use of leisure time: 0  (vi) the client's interpersonal functioning, including relationships with the client's family and other natural supports: 0  (vii) the client's ability to provide self-care and live independently: 1  Client is working towards \"adulting\" goals such as living independently and being financially independent from parents.  (viii) the client's medical and dental health: 0  (ix) the client's financial assistance needs: 0  (x) the client's housing and transportation needs: 0    Client Strengths:  caring, creative, educated, empathetic, employed, has a previous history of therapy, motivated, open to learning, open to suggestions / feedback, and support of family, friends and providers    Areas of Vulnerability:  Depression with possible mood fluctuations.  Considering potential for bipolar 2 disorder so have made a referral to in house psychiatry.     Narrative summary of the client's strengths, resources, and all areas of functional impairment: Client is working on the developmentally appropriate task of \"adulting.\"  Has been successfully working for a couple of years and took some community college courses but then dropped out. Is trying to not put pressure on self to \"know what I want to do for the rest of my life.\"  Client is working towards living on own and being financially independent. Client has not been on any medications for about a year but she has noticed, recently, that she is having some mood swings so we have made referral to in-house psychiatry to assess this.      Treatment Plan   Treatment plan will be reviewed in 180 days or when goals have been changed.    Anticipated number of session for this episode " of care: 6  Anticipation frequency of session: monthly    Client Participation in Plan:  Contributed to goals and plan   Agrees with plan       For this six month period of time, what are your [1 - 3] goals for treatment?  I think this is a safe place to check in about any ongoing issues that come up between appointments     GOAL 1: Gender Embodiment  Measureable treatment objectives:  Client will report increased comfort with disclosing to romantic/sexual partners.  Client will continue to explore the extent to which she wants to engage in medical interventions for her genital dysphoria.      GOAL 2: Increase [place]  Measureable treatment objectives:  Client will continue to pay for her phone, car insurance, and copays.  Client will explore possibilities of living outside of the family home.       GOAL 3: Stabilize Mental Health  Measureable treatment objectives:  Client will follow-through with referral to in-house psychiatry provider  If prescribed, client will take prescribed medication  If needed, client will return to a high level of therapy frequency.    Treatment Strategy (Theoretical Framework): CBT, Supportive and GALA-Driven Gender Health Approach  Modality (Check all that apply): X Individual  Proposed Interventions:   Therapist will monitor client's comfort with disclosure and engage in any needed problem solving strategies around identity disclosure with potential romantic/sexual partners.  Therapist will regularly check in on mental health status and reinforce taking medication if prescribed.  Therapist will provide a safe space for Mike to explore her thoughts and feelings about moving towards independence.  Therapist will reinforce positive coping skills already acquired.   Therapist will support Mike's gender embodiment goals and discuss how to achieve greater comfort in her body.       Other [facility] providers involved in care include: none presently  Referral / Collaboration: To [facility] for  psychiatric care Was/were discussed and patient will pursue.    Note: Measureable means subjective or objective assessment of progress, impact on functioning, symptoms severity, and/or behavior related to treatment goals.

## 2025-04-02 ENCOUNTER — LAB (OUTPATIENT)
Dept: LAB | Facility: CLINIC | Age: 20
End: 2025-04-02
Payer: COMMERCIAL

## 2025-04-02 DIAGNOSIS — F64.0 TRANSGENDER PERSON ON HORMONE THERAPY: ICD-10-CM

## 2025-04-02 DIAGNOSIS — Z79.899 TRANSGENDER PERSON ON HORMONE THERAPY: ICD-10-CM

## 2025-04-02 LAB
ESTRADIOL SERPL-MCNC: 222 PG/ML
SHBG SERPL-SCNC: 40 NMOL/L (ref 11–135)

## 2025-04-02 PROCEDURE — 84403 ASSAY OF TOTAL TESTOSTERONE: CPT

## 2025-04-02 PROCEDURE — 82670 ASSAY OF TOTAL ESTRADIOL: CPT

## 2025-04-02 PROCEDURE — 36415 COLL VENOUS BLD VENIPUNCTURE: CPT

## 2025-04-02 PROCEDURE — 84270 ASSAY OF SEX HORMONE GLOBUL: CPT

## 2025-04-03 LAB
TESTOST FREE SERPL-MCNC: 0.27 NG/DL
TESTOST SERPL-MCNC: 17 NG/DL (ref 8–950)

## 2025-04-03 ASSESSMENT — ANXIETY QUESTIONNAIRES
GAD7 TOTAL SCORE: 4
3. WORRYING TOO MUCH ABOUT DIFFERENT THINGS: SEVERAL DAYS
1. FEELING NERVOUS, ANXIOUS, OR ON EDGE: SEVERAL DAYS
7. FEELING AFRAID AS IF SOMETHING AWFUL MIGHT HAPPEN: NOT AT ALL
5. BEING SO RESTLESS THAT IT IS HARD TO SIT STILL: NOT AT ALL
2. NOT BEING ABLE TO STOP OR CONTROL WORRYING: SEVERAL DAYS
IF YOU CHECKED OFF ANY PROBLEMS ON THIS QUESTIONNAIRE, HOW DIFFICULT HAVE THESE PROBLEMS MADE IT FOR YOU TO DO YOUR WORK, TAKE CARE OF THINGS AT HOME, OR GET ALONG WITH OTHER PEOPLE: SOMEWHAT DIFFICULT
6. BECOMING EASILY ANNOYED OR IRRITABLE: SEVERAL DAYS
GAD7 TOTAL SCORE: 4

## 2025-04-03 ASSESSMENT — PATIENT HEALTH QUESTIONNAIRE - PHQ9
5. POOR APPETITE OR OVEREATING: NOT AT ALL
SUM OF ALL RESPONSES TO PHQ QUESTIONS 1-9: 8

## 2025-05-13 ENCOUNTER — PATIENT OUTREACH (OUTPATIENT)
Dept: CARE COORDINATION | Facility: CLINIC | Age: 20
End: 2025-05-13

## 2025-06-03 DIAGNOSIS — Z79.899 TRANSGENDER PERSON ON HORMONE THERAPY: ICD-10-CM

## 2025-06-03 DIAGNOSIS — F64.0 TRANSGENDER PERSON ON HORMONE THERAPY: ICD-10-CM

## 2025-06-03 RX ORDER — SPIRONOLACTONE 50 MG/1
50 TABLET, FILM COATED ORAL DAILY
Qty: 90 TABLET | Refills: 0 | Status: SHIPPED | OUTPATIENT
Start: 2025-06-03

## 2025-06-03 NOTE — TELEPHONE ENCOUNTER
"Spironolactone Refill Protocol     Is patient over 3 months overdue for visit? No    Has the patient had any hospitalizations since last visit for gonadectomy, psych admission, veinous thromboses, cardiac or life threatening event:No    BP Readings from Last 1 Encounters:   12/03/24 133/80        Is it above 90/60 and done within the last 6 months and while patient has started medication? Yes    Review most recent lab work: Has patient Had basic metabolic panel including K+ within last 15 months, K+ within normal range? If significant abnormalities (K+ any abnormal, glucose >200; Na+ >2 points -F/\" normal, etc), Yes    If any red answers - Send to prescriber.     Otherwise:   Ok to fill for 90 days - unless overdue for appointment. If so, send 30 days and have patient schedule.     Medication refill approved per refill protocol. Wixt message sent to patient reminding them to schedule follow up visit.     "

## 2025-07-12 ENCOUNTER — HEALTH MAINTENANCE LETTER (OUTPATIENT)
Age: 20
End: 2025-07-12

## 2025-08-05 ENCOUNTER — OFFICE VISIT (OUTPATIENT)
Dept: PSYCHOLOGY | Facility: CLINIC | Age: 20
End: 2025-08-05
Payer: COMMERCIAL

## 2025-08-05 DIAGNOSIS — F33.41 RECURRENT MAJOR DEPRESSION IN PARTIAL REMISSION: ICD-10-CM

## 2025-08-05 DIAGNOSIS — F64.0 TRANSSEXUALISM: ICD-10-CM

## 2025-08-05 DIAGNOSIS — F43.22 ACUTE ADJUSTMENT DISORDER WITH ANXIETY: Primary | ICD-10-CM

## 2025-08-05 PROCEDURE — 90837 PSYTX W PT 60 MINUTES: CPT | Performed by: PSYCHOLOGIST

## 2025-08-14 DIAGNOSIS — F64.0 TRANSGENDER PERSON ON HORMONE THERAPY: ICD-10-CM

## 2025-08-14 DIAGNOSIS — Z79.899 TRANSGENDER PERSON ON HORMONE THERAPY: ICD-10-CM

## 2025-08-14 RX ORDER — SPIRONOLACTONE 50 MG/1
50 TABLET, FILM COATED ORAL DAILY
Qty: 30 TABLET | Refills: 0 | Status: SHIPPED | OUTPATIENT
Start: 2025-08-14

## 2025-08-14 RX ORDER — ESTRADIOL 0.1 MG/D
2 FILM, EXTENDED RELEASE TRANSDERMAL
Qty: 16 PATCH | Refills: 0 | Status: SHIPPED | OUTPATIENT
Start: 2025-08-14